# Patient Record
Sex: FEMALE | Race: WHITE | Employment: OTHER | URBAN - METROPOLITAN AREA
[De-identification: names, ages, dates, MRNs, and addresses within clinical notes are randomized per-mention and may not be internally consistent; named-entity substitution may affect disease eponyms.]

---

## 2017-01-06 ENCOUNTER — ANTI-COAG VISIT (OUTPATIENT)
Dept: CARDIOLOGY | Age: 67
End: 2017-01-06

## 2017-01-06 LAB — INR BLD: 2.2

## 2017-02-14 RX ORDER — SOTALOL HYDROCHLORIDE 80 MG/1
TABLET ORAL
Qty: 180 TABLET | Refills: 2 | Status: SHIPPED | OUTPATIENT
Start: 2017-02-14 | End: 2017-11-15 | Stop reason: SDUPTHER

## 2017-02-17 ENCOUNTER — ANTI-COAG VISIT (OUTPATIENT)
Dept: CARDIOLOGY | Age: 67
End: 2017-02-17

## 2017-02-17 LAB — INR BLD: 3.33

## 2017-02-21 DIAGNOSIS — I25.10 CORONARY ARTERY DISEASE INVOLVING NATIVE CORONARY ARTERY OF NATIVE HEART, ANGINA PRESENCE UNSPECIFIED: ICD-10-CM

## 2017-02-21 DIAGNOSIS — Z95.2 HEART VALVE REPLACED: ICD-10-CM

## 2017-02-21 DIAGNOSIS — I48.0 PAROXYSMAL ATRIAL FIBRILLATION (HCC): Primary | ICD-10-CM

## 2017-02-21 DIAGNOSIS — E78.00 PURE HYPERCHOLESTEROLEMIA: ICD-10-CM

## 2017-02-21 DIAGNOSIS — I10 ESSENTIAL HYPERTENSION: ICD-10-CM

## 2017-02-21 RX ORDER — FLUVASTATIN 40 MG/1
40 CAPSULE ORAL 2 TIMES DAILY
Qty: 180 CAPSULE | Refills: 3 | OUTPATIENT
Start: 2017-02-21 | End: 2017-11-17 | Stop reason: SDUPTHER

## 2017-03-02 DIAGNOSIS — Z79.01 ON ANTICOAGULANT THERAPY: Primary | ICD-10-CM

## 2017-03-02 DIAGNOSIS — I48.0 PAROXYSMAL ATRIAL FIBRILLATION (HCC): ICD-10-CM

## 2017-03-10 ENCOUNTER — ANTI-COAG VISIT (OUTPATIENT)
Dept: CARDIOLOGY | Age: 67
End: 2017-03-10

## 2017-03-10 LAB — INR BLD: 3.09

## 2017-04-03 LAB — INR BLD: 3.17

## 2017-04-14 ENCOUNTER — ANTI-COAG VISIT (OUTPATIENT)
Dept: CARDIOLOGY | Age: 67
End: 2017-04-14

## 2017-05-09 LAB — INR BLD: 2.62

## 2017-05-10 ENCOUNTER — ANTI-COAG VISIT (OUTPATIENT)
Dept: CARDIOLOGY | Age: 67
End: 2017-05-10

## 2017-06-02 RX ORDER — LOSARTAN POTASSIUM 50 MG/1
50 TABLET ORAL DAILY
Qty: 90 TABLET | Refills: 2 | Status: SHIPPED | OUTPATIENT
Start: 2017-06-02 | End: 2017-12-04 | Stop reason: ALTCHOICE

## 2017-06-02 RX ORDER — LOSARTAN POTASSIUM 50 MG/1
50 TABLET ORAL DAILY
Refills: 0 | COMMUNITY
Start: 2017-04-14 | End: 2017-06-02 | Stop reason: SDUPTHER

## 2017-06-15 LAB — INR BLD: 1.71

## 2017-06-19 RX ORDER — WARFARIN SODIUM 5 MG/1
TABLET ORAL
Qty: 180 TABLET | Refills: 2 | Status: SHIPPED | OUTPATIENT
Start: 2017-06-19 | End: 2017-12-12 | Stop reason: SDUPTHER

## 2017-06-29 ENCOUNTER — ANTI-COAG VISIT (OUTPATIENT)
Dept: CARDIOLOGY | Age: 67
End: 2017-06-29

## 2017-07-05 LAB — INR BLD: 2.22

## 2017-07-11 ENCOUNTER — ANTI-COAG VISIT (OUTPATIENT)
Dept: CARDIOLOGY | Age: 67
End: 2017-07-11

## 2017-08-21 LAB — INR BLD: 2.03

## 2017-08-25 ENCOUNTER — ANTI-COAG VISIT (OUTPATIENT)
Dept: CARDIOLOGY | Age: 67
End: 2017-08-25

## 2017-09-12 RX ORDER — LEVOTHYROXINE SODIUM 0.03 MG/1
TABLET ORAL
Qty: 90 TABLET | Refills: 3 | Status: SHIPPED | OUTPATIENT
Start: 2017-09-12 | End: 2017-12-20 | Stop reason: SDUPTHER

## 2017-10-03 ENCOUNTER — ANTI-COAG VISIT (OUTPATIENT)
Dept: CARDIOLOGY | Age: 67
End: 2017-10-03

## 2017-10-03 LAB — INR BLD: 2.61

## 2017-11-07 LAB — INR BLD: 2.12

## 2017-11-08 ENCOUNTER — ANTI-COAG VISIT (OUTPATIENT)
Dept: CARDIOLOGY | Age: 67
End: 2017-11-08

## 2017-11-08 LAB — INR BLD: 2.12

## 2017-11-09 ENCOUNTER — TELEPHONE (OUTPATIENT)
Dept: PHARMACY | Age: 67
End: 2017-11-09

## 2017-11-16 RX ORDER — SOTALOL HYDROCHLORIDE 80 MG/1
TABLET ORAL
Qty: 180 TABLET | Refills: 0 | Status: SHIPPED | OUTPATIENT
Start: 2017-11-16 | End: 2018-03-05 | Stop reason: SDUPTHER

## 2017-11-17 DIAGNOSIS — I48.0 PAROXYSMAL ATRIAL FIBRILLATION (HCC): ICD-10-CM

## 2017-11-17 DIAGNOSIS — E78.00 PURE HYPERCHOLESTEROLEMIA: ICD-10-CM

## 2017-11-17 DIAGNOSIS — I10 ESSENTIAL HYPERTENSION: ICD-10-CM

## 2017-11-17 DIAGNOSIS — Z95.2 HEART VALVE REPLACED: ICD-10-CM

## 2017-11-17 DIAGNOSIS — I25.10 CORONARY ARTERY DISEASE INVOLVING NATIVE CORONARY ARTERY OF NATIVE HEART, ANGINA PRESENCE UNSPECIFIED: ICD-10-CM

## 2017-11-20 RX ORDER — FLUVASTATIN 40 MG/1
40 CAPSULE ORAL 2 TIMES DAILY
Qty: 180 CAPSULE | Refills: 0 | Status: SHIPPED | OUTPATIENT
Start: 2017-11-20 | End: 2018-03-05 | Stop reason: SDUPTHER

## 2017-11-29 DIAGNOSIS — Z95.2 HEART VALVE REPLACED: ICD-10-CM

## 2017-11-29 DIAGNOSIS — Z79.01 ON ANTICOAGULANT THERAPY: ICD-10-CM

## 2017-11-29 DIAGNOSIS — I48.0 PAROXYSMAL ATRIAL FIBRILLATION (HCC): Primary | ICD-10-CM

## 2017-12-04 ENCOUNTER — OFFICE VISIT (OUTPATIENT)
Dept: CARDIOLOGY | Age: 67
End: 2017-12-04

## 2017-12-04 VITALS
TEMPERATURE: 98.9 F | HEIGHT: 67 IN | SYSTOLIC BLOOD PRESSURE: 144 MMHG | DIASTOLIC BLOOD PRESSURE: 80 MMHG | WEIGHT: 178 LBS | HEART RATE: 63 BPM | BODY MASS INDEX: 27.94 KG/M2 | RESPIRATION RATE: 16 BRPM | OXYGEN SATURATION: 100 %

## 2017-12-04 DIAGNOSIS — I10 ESSENTIAL HYPERTENSION: ICD-10-CM

## 2017-12-04 DIAGNOSIS — I48.20 CHRONIC ATRIAL FIBRILLATION (HCC): ICD-10-CM

## 2017-12-04 DIAGNOSIS — Z79.01 ON ANTICOAGULANT THERAPY: ICD-10-CM

## 2017-12-04 DIAGNOSIS — Z95.2 H/O PROSTHETIC AORTIC VALVE REPLACEMENT: Primary | ICD-10-CM

## 2017-12-04 DIAGNOSIS — E78.00 PURE HYPERCHOLESTEROLEMIA: ICD-10-CM

## 2017-12-04 PROCEDURE — 3017F COLORECTAL CA SCREEN DOC REV: CPT | Performed by: INTERNAL MEDICINE

## 2017-12-04 PROCEDURE — G8598 ASA/ANTIPLAT THER USED: HCPCS | Performed by: INTERNAL MEDICINE

## 2017-12-04 PROCEDURE — 93000 ELECTROCARDIOGRAM COMPLETE: CPT | Performed by: INTERNAL MEDICINE

## 2017-12-04 PROCEDURE — G8428 CUR MEDS NOT DOCUMENT: HCPCS | Performed by: INTERNAL MEDICINE

## 2017-12-04 PROCEDURE — G8484 FLU IMMUNIZE NO ADMIN: HCPCS | Performed by: INTERNAL MEDICINE

## 2017-12-04 PROCEDURE — 1090F PRES/ABSN URINE INCON ASSESS: CPT | Performed by: INTERNAL MEDICINE

## 2017-12-04 PROCEDURE — G8400 PT W/DXA NO RESULTS DOC: HCPCS | Performed by: INTERNAL MEDICINE

## 2017-12-04 PROCEDURE — 3014F SCREEN MAMMO DOC REV: CPT | Performed by: INTERNAL MEDICINE

## 2017-12-04 PROCEDURE — 1036F TOBACCO NON-USER: CPT | Performed by: INTERNAL MEDICINE

## 2017-12-04 PROCEDURE — 4040F PNEUMOC VAC/ADMIN/RCVD: CPT | Performed by: INTERNAL MEDICINE

## 2017-12-04 PROCEDURE — 99214 OFFICE O/P EST MOD 30 MIN: CPT | Performed by: INTERNAL MEDICINE

## 2017-12-04 PROCEDURE — 1123F ACP DISCUSS/DSCN MKR DOCD: CPT | Performed by: INTERNAL MEDICINE

## 2017-12-04 PROCEDURE — G8419 CALC BMI OUT NRM PARAM NOF/U: HCPCS | Performed by: INTERNAL MEDICINE

## 2017-12-04 ASSESSMENT — ENCOUNTER SYMPTOMS
COUGH: 0
NAUSEA: 0
EYES NEGATIVE: 1
WHEEZING: 0
CHEST TIGHTNESS: 0
BLOOD IN STOOL: 0
SHORTNESS OF BREATH: 0
GASTROINTESTINAL NEGATIVE: 1
STRIDOR: 0
RESPIRATORY NEGATIVE: 1

## 2017-12-07 ENCOUNTER — ANTI-COAG VISIT (OUTPATIENT)
Dept: CARDIOLOGY | Age: 67
End: 2017-12-07

## 2017-12-07 DIAGNOSIS — I48.0 PAROXYSMAL ATRIAL FIBRILLATION (HCC): Primary | ICD-10-CM

## 2017-12-07 DIAGNOSIS — Z79.01 ANTICOAGULANT LONG-TERM USE: ICD-10-CM

## 2017-12-07 DIAGNOSIS — R60.0 BILATERAL LEG EDEMA: Primary | ICD-10-CM

## 2017-12-07 DIAGNOSIS — I48.0 PAROXYSMAL ATRIAL FIBRILLATION (HCC): ICD-10-CM

## 2017-12-12 ENCOUNTER — ANTI-COAG VISIT (OUTPATIENT)
Dept: CARDIOLOGY | Age: 67
End: 2017-12-12

## 2017-12-12 LAB — INR BLD: 2

## 2017-12-13 RX ORDER — WARFARIN SODIUM 5 MG/1
TABLET ORAL
Qty: 180 TABLET | Refills: 2 | Status: SHIPPED | OUTPATIENT
Start: 2017-12-13 | End: 2017-12-20 | Stop reason: SDUPTHER

## 2017-12-20 RX ORDER — LEVOTHYROXINE SODIUM 0.03 MG/1
TABLET ORAL
Qty: 90 TABLET | Refills: 3 | Status: SHIPPED | OUTPATIENT
Start: 2017-12-20 | End: 2018-11-30 | Stop reason: SDUPTHER

## 2017-12-20 RX ORDER — WARFARIN SODIUM 5 MG/1
TABLET ORAL
Qty: 180 TABLET | Refills: 2 | Status: SHIPPED | OUTPATIENT
Start: 2017-12-20 | End: 2018-11-15 | Stop reason: SDUPTHER

## 2018-01-17 ENCOUNTER — ANTI-COAG VISIT (OUTPATIENT)
Dept: CARDIOLOGY | Age: 68
End: 2018-01-17

## 2018-01-17 LAB — INR BLD: 2.27

## 2018-02-20 LAB
INR BLD: 2.74 (ref 0.9–1.1)
PROTHROMBIN TIME: 30.7 SEC (ref 9.8–12.7)

## 2018-02-21 ENCOUNTER — ANTI-COAG VISIT (OUTPATIENT)
Dept: CARDIOLOGY CLINIC | Age: 68
End: 2018-02-21

## 2018-02-21 DIAGNOSIS — I48.0 PAROXYSMAL ATRIAL FIBRILLATION (HCC): ICD-10-CM

## 2018-02-21 DIAGNOSIS — Z95.2 HEART VALVE REPLACED: ICD-10-CM

## 2018-02-21 DIAGNOSIS — Z79.01 ON ANTICOAGULANT THERAPY: ICD-10-CM

## 2018-02-21 LAB — INR BLD: 2.74

## 2018-03-05 DIAGNOSIS — Z95.2 HEART VALVE REPLACED: ICD-10-CM

## 2018-03-05 DIAGNOSIS — I48.0 PAROXYSMAL ATRIAL FIBRILLATION (HCC): ICD-10-CM

## 2018-03-05 DIAGNOSIS — E78.00 PURE HYPERCHOLESTEROLEMIA: ICD-10-CM

## 2018-03-05 DIAGNOSIS — I25.10 CORONARY ARTERY DISEASE INVOLVING NATIVE CORONARY ARTERY OF NATIVE HEART, ANGINA PRESENCE UNSPECIFIED: ICD-10-CM

## 2018-03-05 DIAGNOSIS — I10 ESSENTIAL HYPERTENSION: ICD-10-CM

## 2018-03-05 RX ORDER — SOTALOL HYDROCHLORIDE 80 MG/1
TABLET ORAL
Qty: 180 TABLET | Refills: 2 | Status: SHIPPED | OUTPATIENT
Start: 2018-03-05 | End: 2018-11-30 | Stop reason: SDUPTHER

## 2018-03-05 RX ORDER — FLUVASTATIN 40 MG/1
40 CAPSULE ORAL 2 TIMES DAILY
Qty: 180 CAPSULE | Refills: 1 | Status: SHIPPED | OUTPATIENT
Start: 2018-03-05 | End: 2018-08-15 | Stop reason: SDUPTHER

## 2018-03-28 LAB
INR BLD: 2.3 (ref 0.9–1.1)
PROTHROMBIN TIME: 25.7 SEC (ref 9.8–12.7)

## 2018-03-30 ENCOUNTER — ANTI-COAG VISIT (OUTPATIENT)
Dept: CARDIOLOGY CLINIC | Age: 68
End: 2018-03-30

## 2018-04-30 LAB
INR BLD: 3.35 (ref 0.9–1.1)
PROTHROMBIN TIME: 37.6 SEC (ref 9.8–12.7)

## 2018-05-04 ENCOUNTER — ANTI-COAG VISIT (OUTPATIENT)
Dept: CARDIOLOGY CLINIC | Age: 68
End: 2018-05-04

## 2018-05-18 LAB
INR BLD: 3.25 (ref 0.9–1.1)
PROTHROMBIN TIME: 36.5 SEC (ref 9.8–12.7)

## 2018-05-23 ENCOUNTER — ANTI-COAG VISIT (OUTPATIENT)
Dept: CARDIOLOGY CLINIC | Age: 68
End: 2018-05-23

## 2018-06-18 LAB
INR BLD: 2.63 (ref 0.9–1.1)
PROTHROMBIN TIME: 29.5 SEC (ref 9.8–12.7)

## 2018-06-19 ENCOUNTER — ANTI-COAG VISIT (OUTPATIENT)
Dept: CARDIOLOGY CLINIC | Age: 68
End: 2018-06-19

## 2018-06-19 DIAGNOSIS — I48.0 PAROXYSMAL ATRIAL FIBRILLATION (HCC): ICD-10-CM

## 2018-06-19 DIAGNOSIS — Z79.01 ON ANTICOAGULANT THERAPY: ICD-10-CM

## 2018-06-19 DIAGNOSIS — Z95.2 HEART VALVE REPLACED: ICD-10-CM

## 2018-08-01 LAB
INR BLD: 2.42 (ref 0.9–1.1)
PROTHROMBIN TIME: 27.1 SEC (ref 9.8–12.7)

## 2018-08-03 ENCOUNTER — ANTI-COAG VISIT (OUTPATIENT)
Dept: CARDIOLOGY CLINIC | Age: 68
End: 2018-08-03

## 2018-08-15 DIAGNOSIS — Z95.2 HEART VALVE REPLACED: ICD-10-CM

## 2018-08-15 DIAGNOSIS — E78.00 PURE HYPERCHOLESTEROLEMIA: ICD-10-CM

## 2018-08-15 DIAGNOSIS — I48.0 PAROXYSMAL ATRIAL FIBRILLATION (HCC): ICD-10-CM

## 2018-08-15 DIAGNOSIS — I10 ESSENTIAL HYPERTENSION: ICD-10-CM

## 2018-08-15 DIAGNOSIS — I25.10 CORONARY ARTERY DISEASE INVOLVING NATIVE CORONARY ARTERY OF NATIVE HEART, ANGINA PRESENCE UNSPECIFIED: ICD-10-CM

## 2018-08-16 RX ORDER — FLUVASTATIN 40 MG/1
40 CAPSULE ORAL 2 TIMES DAILY
Qty: 180 CAPSULE | Refills: 3 | Status: SHIPPED | OUTPATIENT
Start: 2018-08-16 | End: 2018-09-11 | Stop reason: SDUPTHER

## 2018-09-06 LAB
INR BLD: 2.67 (ref 0.9–1.1)
PROTHROMBIN TIME: 29.9 SEC (ref 9.8–12.7)

## 2018-09-11 DIAGNOSIS — I25.10 CORONARY ARTERY DISEASE INVOLVING NATIVE CORONARY ARTERY OF NATIVE HEART, ANGINA PRESENCE UNSPECIFIED: ICD-10-CM

## 2018-09-11 DIAGNOSIS — Z95.2 HEART VALVE REPLACED: ICD-10-CM

## 2018-09-11 DIAGNOSIS — I10 ESSENTIAL HYPERTENSION: ICD-10-CM

## 2018-09-11 DIAGNOSIS — E78.00 PURE HYPERCHOLESTEROLEMIA: ICD-10-CM

## 2018-09-11 DIAGNOSIS — I48.0 PAROXYSMAL ATRIAL FIBRILLATION (HCC): ICD-10-CM

## 2018-09-11 RX ORDER — FLUVASTATIN 40 MG/1
40 CAPSULE ORAL 2 TIMES DAILY
Qty: 180 CAPSULE | Refills: 3 | Status: SHIPPED | OUTPATIENT
Start: 2018-09-11 | End: 2019-01-01 | Stop reason: SDUPTHER

## 2018-09-17 ENCOUNTER — ANTI-COAG VISIT (OUTPATIENT)
Dept: CARDIOLOGY CLINIC | Age: 68
End: 2018-09-17

## 2018-10-19 LAB
INR BLD: 2.57 (ref 0.9–1.1)
PROTHROMBIN TIME: 28.8 SEC (ref 9.8–12.7)

## 2018-10-22 ENCOUNTER — ANTI-COAG VISIT (OUTPATIENT)
Dept: CARDIOLOGY CLINIC | Age: 68
End: 2018-10-22

## 2018-10-22 DIAGNOSIS — Z95.2 HEART VALVE REPLACED: ICD-10-CM

## 2018-10-22 DIAGNOSIS — I48.0 PAROXYSMAL ATRIAL FIBRILLATION (HCC): ICD-10-CM

## 2018-10-22 DIAGNOSIS — Z79.01 ON ANTICOAGULANT THERAPY: ICD-10-CM

## 2018-11-16 RX ORDER — WARFARIN SODIUM 5 MG/1
TABLET ORAL
Qty: 180 TABLET | Refills: 3 | Status: SHIPPED | OUTPATIENT
Start: 2018-11-16 | End: 2019-01-01 | Stop reason: SDUPTHER

## 2018-11-26 LAB
INR BLD: 3.55 (ref 0.9–1.1)
PROTHROMBIN TIME: 41.2 SEC (ref 9.7–12.7)

## 2018-11-29 ENCOUNTER — ANTI-COAG VISIT (OUTPATIENT)
Dept: CARDIOLOGY CLINIC | Age: 68
End: 2018-11-29

## 2018-11-30 RX ORDER — SOTALOL HYDROCHLORIDE 80 MG/1
TABLET ORAL
Qty: 180 TABLET | Refills: 3 | Status: SHIPPED | OUTPATIENT
Start: 2018-11-30 | End: 2019-01-01 | Stop reason: SDUPTHER

## 2018-11-30 RX ORDER — LEVOTHYROXINE SODIUM 0.03 MG/1
TABLET ORAL
Qty: 90 TABLET | Refills: 3 | Status: SHIPPED | OUTPATIENT
Start: 2018-11-30 | End: 2019-01-01 | Stop reason: SDUPTHER

## 2018-12-10 LAB
INR BLD: 3.13 (ref 0.9–1.1)
PROTHROMBIN TIME: 36.2 SEC (ref 9.7–12.7)

## 2018-12-26 ENCOUNTER — ANTI-COAG VISIT (OUTPATIENT)
Dept: CARDIOLOGY CLINIC | Age: 68
End: 2018-12-26

## 2019-01-01 ENCOUNTER — HOSPITAL ENCOUNTER (OUTPATIENT)
Dept: PHARMACY | Age: 69
Setting detail: THERAPIES SERIES
Discharge: HOME | End: 2019-12-30
Payer: MEDICARE

## 2019-01-01 ENCOUNTER — HOSPITAL ENCOUNTER (OUTPATIENT)
Dept: PHARMACY | Age: 69
Setting detail: THERAPIES SERIES
Discharge: HOME | End: 2019-12-11
Payer: MEDICARE

## 2019-01-01 ENCOUNTER — HOSPITAL ENCOUNTER (OUTPATIENT)
Dept: PHARMACY | Age: 69
Setting detail: THERAPIES SERIES
Discharge: HOME | End: 2019-07-15
Payer: MEDICARE

## 2019-01-01 ENCOUNTER — HOSPITAL ENCOUNTER (OUTPATIENT)
Dept: PHARMACY | Age: 69
Setting detail: THERAPIES SERIES
Discharge: HOME | End: 2019-08-15
Payer: MEDICARE

## 2019-01-01 ENCOUNTER — HOSPITAL ENCOUNTER (OUTPATIENT)
Dept: PHARMACY | Age: 69
Setting detail: THERAPIES SERIES
Discharge: HOME | End: 2019-07-31
Payer: MEDICARE

## 2019-01-01 ENCOUNTER — APPOINTMENT (OUTPATIENT)
Dept: PHARMACY | Age: 69
End: 2019-01-01
Payer: MEDICARE

## 2019-01-01 ENCOUNTER — ANTI-COAG VISIT (OUTPATIENT)
Dept: CARDIOLOGY CLINIC | Age: 69
End: 2019-01-01

## 2019-01-01 ENCOUNTER — HOSPITAL ENCOUNTER (OUTPATIENT)
Dept: PHARMACY | Age: 69
Setting detail: THERAPIES SERIES
Discharge: HOME | End: 2019-11-07
Payer: MEDICARE

## 2019-01-01 ENCOUNTER — TELEPHONE (OUTPATIENT)
Dept: PHARMACY | Age: 69
End: 2019-01-01

## 2019-01-01 ENCOUNTER — HOSPITAL ENCOUNTER (OUTPATIENT)
Dept: PHARMACY | Age: 69
Setting detail: THERAPIES SERIES
Discharge: HOME | End: 2019-05-14
Payer: MEDICARE

## 2019-01-01 ENCOUNTER — HOSPITAL ENCOUNTER (OUTPATIENT)
Dept: PHARMACY | Age: 69
Setting detail: THERAPIES SERIES
Discharge: HOME | End: 2019-05-28
Payer: MEDICARE

## 2019-01-01 ENCOUNTER — HOSPITAL ENCOUNTER (OUTPATIENT)
Dept: PHARMACY | Age: 69
Setting detail: THERAPIES SERIES
Discharge: HOME | End: 2019-10-03
Payer: MEDICARE

## 2019-01-01 ENCOUNTER — HOSPITAL ENCOUNTER (OUTPATIENT)
Dept: PHARMACY | Age: 69
Setting detail: THERAPIES SERIES
Discharge: HOME | End: 2019-08-29
Payer: MEDICARE

## 2019-01-01 ENCOUNTER — HOSPITAL ENCOUNTER (OUTPATIENT)
Dept: PHARMACY | Age: 69
Setting detail: THERAPIES SERIES
Discharge: HOME | End: 2019-06-12
Payer: MEDICARE

## 2019-01-01 DIAGNOSIS — I48.20 CHRONIC ATRIAL FIBRILLATION (HCC): ICD-10-CM

## 2019-01-01 DIAGNOSIS — I10 ESSENTIAL HYPERTENSION: ICD-10-CM

## 2019-01-01 DIAGNOSIS — I25.10 CORONARY ARTERY DISEASE INVOLVING NATIVE CORONARY ARTERY OF NATIVE HEART, ANGINA PRESENCE UNSPECIFIED: ICD-10-CM

## 2019-01-01 DIAGNOSIS — E78.00 PURE HYPERCHOLESTEROLEMIA: ICD-10-CM

## 2019-01-01 DIAGNOSIS — I48.0 PAROXYSMAL ATRIAL FIBRILLATION (HCC): ICD-10-CM

## 2019-01-01 DIAGNOSIS — Z95.2 HEART VALVE REPLACED: ICD-10-CM

## 2019-01-01 LAB
INTERNATIONAL NORMALIZATION RATIO, POC: 1.3
INTERNATIONAL NORMALIZATION RATIO, POC: 1.5
INTERNATIONAL NORMALIZATION RATIO, POC: 1.5
INTERNATIONAL NORMALIZATION RATIO, POC: 1.8
INTERNATIONAL NORMALIZATION RATIO, POC: 2
INTERNATIONAL NORMALIZATION RATIO, POC: 2.1
INTERNATIONAL NORMALIZATION RATIO, POC: 2.6
INTERNATIONAL NORMALIZATION RATIO, POC: 2.9
INTERNATIONAL NORMALIZATION RATIO, POC: 3.2
INTERNATIONAL NORMALIZATION RATIO, POC: 3.5
INTERNATIONAL NORMALIZATION RATIO, POC: 4.4

## 2019-01-01 PROCEDURE — 85610 PROTHROMBIN TIME: CPT

## 2019-01-01 PROCEDURE — 99211 OFF/OP EST MAY X REQ PHY/QHP: CPT

## 2019-01-01 PROCEDURE — 85610 PROTHROMBIN TIME: CPT | Performed by: PHARMACIST

## 2019-01-01 PROCEDURE — 99211 OFF/OP EST MAY X REQ PHY/QHP: CPT | Performed by: PHARMACIST

## 2019-01-01 RX ORDER — FLUVASTATIN 40 MG/1
40 CAPSULE ORAL 2 TIMES DAILY
Qty: 60 CAPSULE | Refills: 2 | Status: SHIPPED | OUTPATIENT
Start: 2019-01-01 | End: 2019-01-01 | Stop reason: ALTCHOICE

## 2019-01-10 LAB
INR BLD: 3.84 (ref 0.9–1.1)
PROTHROMBIN TIME: 44.6 SEC (ref 9.7–12.7)

## 2019-01-14 ENCOUNTER — OFFICE VISIT (OUTPATIENT)
Dept: CARDIOLOGY CLINIC | Age: 69
End: 2019-01-14
Payer: MEDICARE

## 2019-01-14 VITALS
RESPIRATION RATE: 20 BRPM | SYSTOLIC BLOOD PRESSURE: 130 MMHG | DIASTOLIC BLOOD PRESSURE: 82 MMHG | WEIGHT: 183.6 LBS | HEART RATE: 71 BPM | BODY MASS INDEX: 28.82 KG/M2 | HEIGHT: 67 IN | OXYGEN SATURATION: 97 %

## 2019-01-14 DIAGNOSIS — I48.20 CHRONIC ATRIAL FIBRILLATION (HCC): Primary | ICD-10-CM

## 2019-01-14 DIAGNOSIS — I10 ESSENTIAL HYPERTENSION: ICD-10-CM

## 2019-01-14 DIAGNOSIS — I83.93 VARICOSE VEINS OF BOTH LOWER EXTREMITIES, UNSPECIFIED WHETHER COMPLICATED: ICD-10-CM

## 2019-01-14 DIAGNOSIS — I25.10 CORONARY ARTERY DISEASE INVOLVING NATIVE CORONARY ARTERY OF NATIVE HEART, ANGINA PRESENCE UNSPECIFIED: ICD-10-CM

## 2019-01-14 DIAGNOSIS — Z95.2 H/O PROSTHETIC AORTIC VALVE REPLACEMENT: ICD-10-CM

## 2019-01-14 PROCEDURE — G8419 CALC BMI OUT NRM PARAM NOF/U: HCPCS | Performed by: INTERNAL MEDICINE

## 2019-01-14 PROCEDURE — G8400 PT W/DXA NO RESULTS DOC: HCPCS | Performed by: INTERNAL MEDICINE

## 2019-01-14 PROCEDURE — G8427 DOCREV CUR MEDS BY ELIG CLIN: HCPCS | Performed by: INTERNAL MEDICINE

## 2019-01-14 PROCEDURE — G8598 ASA/ANTIPLAT THER USED: HCPCS | Performed by: INTERNAL MEDICINE

## 2019-01-14 PROCEDURE — 1036F TOBACCO NON-USER: CPT | Performed by: INTERNAL MEDICINE

## 2019-01-14 PROCEDURE — 1123F ACP DISCUSS/DSCN MKR DOCD: CPT | Performed by: INTERNAL MEDICINE

## 2019-01-14 PROCEDURE — 1090F PRES/ABSN URINE INCON ASSESS: CPT | Performed by: INTERNAL MEDICINE

## 2019-01-14 PROCEDURE — 3017F COLORECTAL CA SCREEN DOC REV: CPT | Performed by: INTERNAL MEDICINE

## 2019-01-14 PROCEDURE — 4040F PNEUMOC VAC/ADMIN/RCVD: CPT | Performed by: INTERNAL MEDICINE

## 2019-01-14 PROCEDURE — G8484 FLU IMMUNIZE NO ADMIN: HCPCS | Performed by: INTERNAL MEDICINE

## 2019-01-14 PROCEDURE — 99214 OFFICE O/P EST MOD 30 MIN: CPT | Performed by: INTERNAL MEDICINE

## 2019-01-14 PROCEDURE — 93000 ELECTROCARDIOGRAM COMPLETE: CPT | Performed by: INTERNAL MEDICINE

## 2019-01-14 PROCEDURE — 1101F PT FALLS ASSESS-DOCD LE1/YR: CPT | Performed by: INTERNAL MEDICINE

## 2019-01-14 ASSESSMENT — ENCOUNTER SYMPTOMS
EYES NEGATIVE: 1
STRIDOR: 0
BLOOD IN STOOL: 0
GASTROINTESTINAL NEGATIVE: 1
CHEST TIGHTNESS: 0
SHORTNESS OF BREATH: 0
RESPIRATORY NEGATIVE: 1
WHEEZING: 0
NAUSEA: 0
COUGH: 0

## 2019-01-16 ENCOUNTER — ANTI-COAG VISIT (OUTPATIENT)
Dept: CARDIOLOGY CLINIC | Age: 69
End: 2019-01-16

## 2019-01-22 DIAGNOSIS — Z79.01 ON ANTICOAGULANT THERAPY: Primary | ICD-10-CM

## 2019-01-22 DIAGNOSIS — I48.0 PAROXYSMAL ATRIAL FIBRILLATION (HCC): ICD-10-CM

## 2019-01-29 LAB
INR BLD: 2.38 (ref 0.9–1.1)
PROTHROMBIN TIME: 27.4 SEC (ref 9.7–12.7)

## 2019-01-30 ENCOUNTER — ANTI-COAG VISIT (OUTPATIENT)
Dept: CARDIOLOGY CLINIC | Age: 69
End: 2019-01-30

## 2019-03-11 LAB
INR BLD: 2.34 (ref 0.9–1.1)
PROTHROMBIN TIME: 26.9 SEC (ref 9.7–12.7)

## 2019-03-14 ENCOUNTER — ANTI-COAG VISIT (OUTPATIENT)
Dept: CARDIOLOGY CLINIC | Age: 69
End: 2019-03-14

## 2019-04-09 LAB
INR BLD: 2.6 (ref 0.9–1.1)
PROTHROMBIN TIME: 30.3 SEC (ref 9.7–12.7)

## 2019-05-14 NOTE — PROGRESS NOTES
Oral Anticoagulation Patient Education    Counseling provided to: patient  Anticoagulant: Warfarin   Handouts provided to patient include: medication, medications that increase risk of bleeding, nosebleeds    Counseling points included:  1. Indication for anticoagulation: Afib  2. Explanation of medication  3. Education on A. Fib and stroke; PE/DVT  4. Missed doses and timing of medication (contact healthcare provider if missed multiple doses)  5. Side effects: bruising and bleeding with emphasis on seeking medical attention if bleeding prolonged  6. Food or drug interactions that can increase risk of bleeding  7. OTC pain relief options: Tylenol vs NSAIDs  8. Contact provider if:   A. Changes made to medications   B. Uncontrolled bleeding/ Signs and symptoms of recurrent VTE   C. Procedures   D. Trauma and/or fall    E.  Pregnancy (for women of child bearing age)       INR  1.3 is sub therapetuic for this patient (goal range 2-3) and is reflective of 50 mg TWD from past 7 days (maintenance plan 55 mg TWD)  Patient verifies current dosing regimen, patient able to verbally recall dose  Patient reports 1  missed doses since last INR (patient states forgot on day of cataract surgery 5/10)   Patient denies s/sx clotting and/or stroke  Patient denies hematuria, epistaxis, rectal bleeding  Patient denies changes in diet, alcohol, or tobacco use  Reviewed medication list and drug allergies with patient, updated any medication additions or modifications accordingly  Patient also denies any pending medical or dental procedures scheduled at this time  Patient was instructed to boost today to 12.5 mg (usual 10 mg) and tomorrow to 10 mg (usual 5 mg), then return to 55 mg TWD and RTC 1 week, unable to come for 2 weeks

## 2019-06-12 NOTE — PROGRESS NOTES
Ms. Иван Conde is a 80 y.o. y/o female with history of Afib who presents today for anticoagulation monitoring and adjustment.   INR 2.1 is therapeutic for this patient (goal range 2-3) and is reflective of 60 mg TWD  Patient verifies current dosing regimen, patient able to verbally recall dose  Patient reports no  missed doses since last INR   Patient denies s/sx clotting and/or stroke  Patient denies hematuria, epistaxis, rectal bleeding  Patient denies changes in diet, alcohol, or tobacco use  Reviewed medication list and drug allergies with patient, updated any medication additions or modifications accordingly  Patient also denies any pending medical or dental procedures scheduled at this time  Patient was instructed to continue current regimen of 60 mg TWD and RTC 4 weeks (patient was 6 weeks with Dr. Magali Silverman)    Roshan Garcia, PharmD  Staff Pharmacist  6/12/2019 10:57 AM

## 2020-01-01 ENCOUNTER — HOSPITAL ENCOUNTER (OUTPATIENT)
Dept: PHARMACY | Age: 70
Setting detail: THERAPIES SERIES
Discharge: HOME | End: 2020-02-10
Payer: MEDICARE

## 2020-01-01 ENCOUNTER — HOSPITAL ENCOUNTER (OUTPATIENT)
Dept: PHARMACY | Age: 70
Setting detail: THERAPIES SERIES
Discharge: HOME | End: 2020-04-13
Payer: MEDICARE

## 2020-01-01 ENCOUNTER — APPOINTMENT (OUTPATIENT)
Dept: ULTRASOUND IMAGING | Age: 70
DRG: 291 | End: 2020-01-01
Attending: INTERNAL MEDICINE
Payer: MEDICARE

## 2020-01-01 ENCOUNTER — APPOINTMENT (OUTPATIENT)
Dept: GENERAL RADIOLOGY | Age: 70
DRG: 291 | End: 2020-01-01
Attending: INTERNAL MEDICINE
Payer: MEDICARE

## 2020-01-01 ENCOUNTER — OFFICE VISIT (OUTPATIENT)
Dept: CARDIOLOGY CLINIC | Age: 70
End: 2020-01-01
Payer: MEDICARE

## 2020-01-01 ENCOUNTER — TELEPHONE (OUTPATIENT)
Dept: PHARMACY | Age: 70
End: 2020-01-01

## 2020-01-01 ENCOUNTER — TELEPHONE (OUTPATIENT)
Dept: CARDIOLOGY CLINIC | Age: 70
End: 2020-01-01

## 2020-01-01 ENCOUNTER — HOSPITAL ENCOUNTER (INPATIENT)
Age: 70
LOS: 7 days | Discharge: HOME | DRG: 291 | End: 2020-02-04
Attending: INTERNAL MEDICINE | Admitting: INTERNAL MEDICINE
Payer: MEDICARE

## 2020-01-01 ENCOUNTER — HOSPITAL ENCOUNTER (OUTPATIENT)
Dept: PHARMACY | Age: 70
Setting detail: THERAPIES SERIES
Discharge: HOME | End: 2020-03-16
Payer: MEDICARE

## 2020-01-01 ENCOUNTER — HOSPITAL ENCOUNTER (OUTPATIENT)
Dept: PHARMACY | Age: 70
Setting detail: THERAPIES SERIES
Discharge: HOME | End: 2020-03-23
Payer: MEDICARE

## 2020-01-01 VITALS
OXYGEN SATURATION: 98 % | WEIGHT: 186.8 LBS | DIASTOLIC BLOOD PRESSURE: 70 MMHG | BODY MASS INDEX: 29.32 KG/M2 | HEART RATE: 81 BPM | TEMPERATURE: 98.2 F | SYSTOLIC BLOOD PRESSURE: 137 MMHG | HEIGHT: 67 IN | RESPIRATION RATE: 18 BRPM

## 2020-01-01 VITALS
BODY MASS INDEX: 28.72 KG/M2 | SYSTOLIC BLOOD PRESSURE: 136 MMHG | HEART RATE: 76 BPM | RESPIRATION RATE: 18 BRPM | OXYGEN SATURATION: 98 % | DIASTOLIC BLOOD PRESSURE: 86 MMHG | WEIGHT: 183.4 LBS

## 2020-01-01 VITALS
BODY MASS INDEX: 34.39 KG/M2 | DIASTOLIC BLOOD PRESSURE: 84 MMHG | SYSTOLIC BLOOD PRESSURE: 136 MMHG | WEIGHT: 219.6 LBS | OXYGEN SATURATION: 99 % | HEART RATE: 83 BPM | RESPIRATION RATE: 18 BRPM

## 2020-01-01 DIAGNOSIS — I48.20 CHRONIC ATRIAL FIBRILLATION (HCC): ICD-10-CM

## 2020-01-01 LAB
ALBUMIN SERPL-MCNC: 3.6 G/DL (ref 3.5–4.6)
ALP BLD-CCNC: 137 U/L (ref 40–130)
ALT SERPL-CCNC: 8 U/L (ref 0–33)
ANION GAP SERPL CALCULATED.3IONS-SCNC: 13 MEQ/L (ref 9–15)
ANION GAP SERPL CALCULATED.3IONS-SCNC: 14 MEQ/L (ref 9–15)
ANION GAP SERPL CALCULATED.3IONS-SCNC: 16 MEQ/L (ref 9–15)
ANION GAP SERPL CALCULATED.3IONS-SCNC: 16 MEQ/L (ref 9–15)
ANION GAP SERPL CALCULATED.3IONS-SCNC: 17 MEQ/L (ref 9–15)
ANION GAP SERPL CALCULATED.3IONS-SCNC: 17 MEQ/L (ref 9–15)
ANION GAP SERPL CALCULATED.3IONS-SCNC: 18 MEQ/L (ref 9–15)
ANION GAP SERPL CALCULATED.3IONS-SCNC: 20 MEQ/L (ref 9–15)
AST SERPL-CCNC: 12 U/L (ref 0–35)
BACTERIA: NEGATIVE /HPF
BASOPHILS ABSOLUTE: 0 K/UL (ref 0–0.2)
BASOPHILS RELATIVE PERCENT: 0.7 %
BILIRUB SERPL-MCNC: 0.7 MG/DL (ref 0.2–0.7)
BILIRUBIN URINE: NEGATIVE
BLOOD, URINE: NEGATIVE
BUN BLDV-MCNC: 41 MG/DL (ref 8–23)
BUN BLDV-MCNC: 43 MG/DL (ref 8–23)
BUN BLDV-MCNC: 45 MG/DL (ref 8–23)
BUN BLDV-MCNC: 50 MG/DL (ref 8–23)
BUN BLDV-MCNC: 55 MG/DL (ref 8–23)
BUN BLDV-MCNC: 58 MG/DL (ref 8–23)
BUN BLDV-MCNC: 63 MG/DL (ref 8–23)
BUN BLDV-MCNC: 66 MG/DL (ref 8–23)
CALCIUM SERPL-MCNC: 8.7 MG/DL (ref 8.5–9.9)
CALCIUM SERPL-MCNC: 8.7 MG/DL (ref 8.5–9.9)
CALCIUM SERPL-MCNC: 8.8 MG/DL (ref 8.5–9.9)
CALCIUM SERPL-MCNC: 8.9 MG/DL (ref 8.5–9.9)
CHLORIDE BLD-SCNC: 101 MEQ/L (ref 95–107)
CHLORIDE BLD-SCNC: 101 MEQ/L (ref 95–107)
CHLORIDE BLD-SCNC: 103 MEQ/L (ref 95–107)
CHLORIDE BLD-SCNC: 104 MEQ/L (ref 95–107)
CHLORIDE BLD-SCNC: 93 MEQ/L (ref 95–107)
CHLORIDE BLD-SCNC: 93 MEQ/L (ref 95–107)
CHLORIDE BLD-SCNC: 97 MEQ/L (ref 95–107)
CHLORIDE BLD-SCNC: 99 MEQ/L (ref 95–107)
CLARITY: CLEAR
CO2: 18 MEQ/L (ref 20–31)
CO2: 18 MEQ/L (ref 20–31)
CO2: 19 MEQ/L (ref 20–31)
CO2: 20 MEQ/L (ref 20–31)
CO2: 22 MEQ/L (ref 20–31)
CO2: 22 MEQ/L (ref 20–31)
CO2: 24 MEQ/L (ref 20–31)
CO2: 25 MEQ/L (ref 20–31)
COLOR: YELLOW
CREAT SERPL-MCNC: 4.11 MG/DL (ref 0.5–0.9)
CREAT SERPL-MCNC: 4.22 MG/DL (ref 0.5–0.9)
CREAT SERPL-MCNC: 4.36 MG/DL (ref 0.5–0.9)
CREAT SERPL-MCNC: 4.43 MG/DL (ref 0.5–0.9)
CREAT SERPL-MCNC: 4.5 MG/DL (ref 0.5–0.9)
CREAT SERPL-MCNC: 4.67 MG/DL (ref 0.5–0.9)
CREAT SERPL-MCNC: 4.78 MG/DL (ref 0.5–0.9)
CREAT SERPL-MCNC: 5 MG/DL (ref 0.5–0.9)
CREATININE URINE: 45.1 MG/DL
EKG ATRIAL RATE: 115 BPM
EKG ATRIAL RATE: 234 BPM
EKG Q-T INTERVAL: 424 MS
EKG Q-T INTERVAL: 444 MS
EKG QRS DURATION: 84 MS
EKG QRS DURATION: 86 MS
EKG QTC CALCULATION (BAZETT): 495 MS
EKG QTC CALCULATION (BAZETT): 502 MS
EKG R AXIS: 109 DEGREES
EKG R AXIS: 121 DEGREES
EKG T AXIS: 206 DEGREES
EKG T AXIS: 210 DEGREES
EKG VENTRICULAR RATE: 77 BPM
EKG VENTRICULAR RATE: 82 BPM
EOSINOPHILS ABSOLUTE: 0.1 K/UL (ref 0–0.7)
EOSINOPHILS RELATIVE PERCENT: 2.3 %
EPITHELIAL CELLS, UA: NORMAL /HPF (ref 0–5)
ERYTHROCYTE [DISTWIDTH] IN BLOOD BY AUTOMATED COUNT: 16.7 % (ref 11.5–14)
FERRITIN: 47.4 NG/ML (ref 13–150)
FOLATE: 9 NG/ML (ref 7.3–26.1)
GFR AFRICAN AMERICAN: 10.4
GFR AFRICAN AMERICAN: 11
GFR AFRICAN AMERICAN: 11.3
GFR AFRICAN AMERICAN: 11.8
GFR AFRICAN AMERICAN: 12
GFR AFRICAN AMERICAN: 12.2
GFR AFRICAN AMERICAN: 12.7
GFR AFRICAN AMERICAN: 13.1
GFR NON-AFRICAN AMERICAN: 10.1
GFR NON-AFRICAN AMERICAN: 10.5
GFR NON-AFRICAN AMERICAN: 10.8
GFR NON-AFRICAN AMERICAN: 8.6
GFR NON-AFRICAN AMERICAN: 9.1
GFR NON-AFRICAN AMERICAN: 9.3
GFR NON-AFRICAN AMERICAN: 9.7
GFR NON-AFRICAN AMERICAN: 9.9
GLOBULIN: 3.1 G/DL (ref 2.3–3.5)
GLUCOSE BLD-MCNC: 139 MG/DL (ref 70–99)
GLUCOSE BLD-MCNC: 85 MG/DL (ref 70–99)
GLUCOSE BLD-MCNC: 88 MG/DL (ref 70–99)
GLUCOSE BLD-MCNC: 89 MG/DL (ref 70–99)
GLUCOSE BLD-MCNC: 91 MG/DL (ref 70–99)
GLUCOSE BLD-MCNC: 93 MG/DL (ref 70–99)
GLUCOSE BLD-MCNC: 93 MG/DL (ref 70–99)
GLUCOSE BLD-MCNC: 99 MG/DL (ref 70–99)
GLUCOSE URINE: 100 MG/DL
HCT VFR BLD CALC: 30.8 % (ref 37–47)
HCT VFR BLD CALC: 31 % (ref 37–47)
HCT VFR BLD CALC: 31.8 % (ref 37–47)
HCT VFR BLD CALC: 32.3 % (ref 37–47)
HCT VFR BLD CALC: 32.3 % (ref 37–47)
HCT VFR BLD CALC: 32.4 % (ref 37–47)
HCT VFR BLD CALC: 32.5 % (ref 36–46)
HCT VFR BLD CALC: 33.2 % (ref 37–47)
HCT VFR BLD CALC: 34.9 % (ref 37–47)
HEMOGLOBIN: 10 G/DL (ref 12–16)
HEMOGLOBIN: 10 G/DL (ref 12–16)
HEMOGLOBIN: 10.3 G/DL (ref 12–16)
HEMOGLOBIN: 10.4 G/DL (ref 12–16)
HEMOGLOBIN: 10.8 G/DL (ref 12–16)
HEMOGLOBIN: 11 G/DL (ref 12–16)
HEMOGLOBIN: 9.9 G/DL (ref 12–16)
HYALINE CASTS: NORMAL /HPF (ref 0–5)
INR BLD: 1.5
INR BLD: 1.6
INR BLD: 1.9 (ref 0.9–1.1)
INR BLD: 2
INR BLD: 2.1
INR BLD: 3
INR BLD: 3.9
INR BLD: 4
INR BLD: 4.1
INR BLD: 4.2
INTERNATIONAL NORMALIZATION RATIO, POC: 1.4
INTERNATIONAL NORMALIZATION RATIO, POC: 1.8
INTERNATIONAL NORMALIZATION RATIO, POC: 2.7
IRON SATURATION: 13 % (ref 11–46)
IRON: 33 UG/DL (ref 37–145)
KETONES, URINE: NEGATIVE MG/DL
LEUKOCYTE ESTERASE, URINE: NEGATIVE
LV EF: 55 %
LVEF MODALITY: NORMAL
LYMPHOCYTES ABSOLUTE: 0.8 K/UL (ref 1–4.8)
LYMPHOCYTES RELATIVE PERCENT: 27.3 %
MAGNESIUM: 2.5 MG/DL (ref 1.7–2.4)
MAGNESIUM: 2.7 MG/DL (ref 1.7–2.4)
MCH RBC QN AUTO: 26.6 PG (ref 27–31.3)
MCH RBC QN AUTO: 26.7 PG (ref 27–31.3)
MCH RBC QN AUTO: 26.8 PG (ref 27–31.3)
MCH RBC QN AUTO: 27.2 PG (ref 27–31.3)
MCHC RBC AUTO-ENTMCNC: 30.8 G/DL (ref 32–36)
MCHC RBC AUTO-ENTMCNC: 31.5 % (ref 33–37)
MCHC RBC AUTO-ENTMCNC: 32.1 % (ref 33–37)
MCHC RBC AUTO-ENTMCNC: 32.1 % (ref 33–37)
MCHC RBC AUTO-ENTMCNC: 32.2 % (ref 33–37)
MCHC RBC AUTO-ENTMCNC: 32.3 % (ref 33–37)
MCHC RBC AUTO-ENTMCNC: 32.3 % (ref 33–37)
MCHC RBC AUTO-ENTMCNC: 32.4 % (ref 33–37)
MCHC RBC AUTO-ENTMCNC: 32.4 % (ref 33–37)
MCV RBC AUTO: 82.4 FL (ref 82–100)
MCV RBC AUTO: 82.6 FL (ref 82–100)
MCV RBC AUTO: 82.8 FL (ref 82–100)
MCV RBC AUTO: 83 FL (ref 82–100)
MCV RBC AUTO: 83.1 FL (ref 82–100)
MCV RBC AUTO: 83.3 FL (ref 82–100)
MCV RBC AUTO: 83.7 FL (ref 82–100)
MCV RBC AUTO: 84.5 FL (ref 82–100)
MCV RBC AUTO: 86 FL (ref 80–100)
MICROALBUMIN UR-MCNC: 19.2 MG/DL
MICROALBUMIN/CREAT UR-RTO: 425.7 MG/G (ref 0–30)
MONOCYTES ABSOLUTE: 0.3 K/UL (ref 0.2–0.8)
MONOCYTES RELATIVE PERCENT: 9.1 %
NEUTROPHILS ABSOLUTE: 1.7 K/UL (ref 1.4–6.5)
NEUTROPHILS RELATIVE PERCENT: 60.6 %
NITRITE, URINE: NEGATIVE
PARATHYROID HORMONE INTACT: 92.7 PG/ML (ref 15–65)
PDW BLD-RTO: 17.5 % (ref 11.5–14.5)
PDW BLD-RTO: 17.6 % (ref 11.5–14.5)
PDW BLD-RTO: 17.7 % (ref 11.5–14.5)
PDW BLD-RTO: 17.7 % (ref 11.5–14.5)
PDW BLD-RTO: 17.9 % (ref 11.5–14.5)
PDW BLD-RTO: 17.9 % (ref 11.5–14.5)
PDW BLD-RTO: 18 % (ref 11.5–14.5)
PDW BLD-RTO: 18.1 % (ref 11.5–14.5)
PH UA: 5.5 (ref 5–9)
PHOSPHORUS: 5 MG/DL (ref 2.3–4.8)
PLATELET # BLD: 100 K/UL (ref 130–400)
PLATELET # BLD: 101 K/UL (ref 130–400)
PLATELET # BLD: 102 K/UL (ref 130–400)
PLATELET # BLD: 107 K/UL (ref 130–400)
PLATELET # BLD: 112 X10E9/L (ref 150–450)
PLATELET # BLD: 120 K/UL (ref 130–400)
PLATELET # BLD: 95 K/UL (ref 130–400)
PLATELET # BLD: 97 K/UL (ref 130–400)
PLATELET # BLD: 97 K/UL (ref 130–400)
PLATELET SLIDE REVIEW: ABNORMAL
POTASSIUM SERPL-SCNC: 4.2 MEQ/L (ref 3.4–4.9)
POTASSIUM SERPL-SCNC: 4.4 MEQ/L (ref 3.4–4.9)
POTASSIUM SERPL-SCNC: 4.5 MEQ/L (ref 3.4–4.9)
POTASSIUM SERPL-SCNC: 4.7 MEQ/L (ref 3.4–4.9)
POTASSIUM SERPL-SCNC: 5 MEQ/L (ref 3.4–4.9)
POTASSIUM SERPL-SCNC: 5.4 MEQ/L (ref 3.4–4.9)
PROTEIN UA: 30 MG/DL
PROTHROMBIN TIME: 18.4 SEC (ref 12.3–14.9)
PROTHROMBIN TIME: 20.1 SEC (ref 12.3–14.9)
PROTHROMBIN TIME: 21.5 SEC (ref 9.7–12.7)
PROTHROMBIN TIME: 23.5 SEC (ref 12.3–14.9)
PROTHROMBIN TIME: 24.4 SEC (ref 12.3–14.9)
PROTHROMBIN TIME: 32.1 SEC (ref 12.3–14.9)
PROTHROMBIN TIME: 40.3 SEC (ref 12.3–14.9)
PROTHROMBIN TIME: 40.6 SEC (ref 12.3–14.9)
PROTHROMBIN TIME: 41.5 SEC (ref 12.3–14.9)
PROTHROMBIN TIME: 42.8 SEC (ref 12.3–14.9)
RBC # BLD: 3.72 M/UL (ref 4.2–5.4)
RBC # BLD: 3.76 M/UL (ref 4.2–5.4)
RBC # BLD: 3.79 X10E12/L (ref 4–5.2)
RBC # BLD: 3.85 M/UL (ref 4.2–5.4)
RBC # BLD: 3.88 M/UL (ref 4.2–5.4)
RBC # BLD: 3.89 M/UL (ref 4.2–5.4)
RBC # BLD: 3.9 M/UL (ref 4.2–5.4)
RBC # BLD: 3.96 M/UL (ref 4.2–5.4)
RBC # BLD: 4.13 M/UL (ref 4.2–5.4)
RBC UA: NORMAL /HPF (ref 0–5)
RETICULOCYTE ABSOLUTE COUNT: 0.03 M/CUMM (ref 0.02–0.11)
RETICULOCYTE COUNT PCT: 0.8 % (ref 0.6–2.2)
SLIDE REVIEW: ABNORMAL
SODIUM BLD-SCNC: 132 MEQ/L (ref 135–144)
SODIUM BLD-SCNC: 134 MEQ/L (ref 135–144)
SODIUM BLD-SCNC: 135 MEQ/L (ref 135–144)
SODIUM BLD-SCNC: 135 MEQ/L (ref 135–144)
SODIUM BLD-SCNC: 137 MEQ/L (ref 135–144)
SODIUM BLD-SCNC: 138 MEQ/L (ref 135–144)
SODIUM BLD-SCNC: 138 MEQ/L (ref 135–144)
SODIUM BLD-SCNC: 141 MEQ/L (ref 135–144)
SPECIFIC GRAVITY UA: 1.01 (ref 1–1.03)
TOTAL CK: 23 U/L (ref 0–170)
TOTAL IRON BINDING CAPACITY: 262 UG/DL (ref 178–450)
TOTAL PROTEIN: 6.7 G/DL (ref 6.3–8)
TSH SERPL DL<=0.05 MIU/L-ACNC: 4.88 UIU/ML (ref 0.44–3.86)
UROBILINOGEN, URINE: 0.2 E.U./DL
VITAMIN B-12: 347 PG/ML (ref 232–1245)
WBC # BLD: 2.9 K/UL (ref 4.8–10.8)
WBC # BLD: 2.9 K/UL (ref 4.8–10.8)
WBC # BLD: 3.1 K/UL (ref 4.8–10.8)
WBC # BLD: 3.2 K/UL (ref 4.8–10.8)
WBC # BLD: 3.3 K/UL (ref 4.8–10.8)
WBC # BLD: 3.4 K/UL (ref 4.8–10.8)
WBC # BLD: 3.5 K/UL (ref 4.8–10.8)
WBC # BLD: 3.7 K/UL (ref 4.8–10.8)
WBC UA: NORMAL /HPF (ref 0–5)
WBC: 3.7 X10E9/L (ref 4.4–11.3)

## 2020-01-01 PROCEDURE — 85610 PROTHROMBIN TIME: CPT

## 2020-01-01 PROCEDURE — 2580000003 HC RX 258

## 2020-01-01 PROCEDURE — 80048 BASIC METABOLIC PNL TOTAL CA: CPT

## 2020-01-01 PROCEDURE — 1090F PRES/ABSN URINE INCON ASSESS: CPT | Performed by: INTERNAL MEDICINE

## 2020-01-01 PROCEDURE — 85046 RETICYTE/HGB CONCENTRATE: CPT

## 2020-01-01 PROCEDURE — 2580000003 HC RX 258: Performed by: INTERNAL MEDICINE

## 2020-01-01 PROCEDURE — 85027 COMPLETE CBC AUTOMATED: CPT

## 2020-01-01 PROCEDURE — 99239 HOSP IP/OBS DSCHRG MGMT >30: CPT | Performed by: INTERNAL MEDICINE

## 2020-01-01 PROCEDURE — 83970 ASSAY OF PARATHORMONE: CPT

## 2020-01-01 PROCEDURE — 82607 VITAMIN B-12: CPT

## 2020-01-01 PROCEDURE — 4040F PNEUMOC VAC/ADMIN/RCVD: CPT | Performed by: INTERNAL MEDICINE

## 2020-01-01 PROCEDURE — 2060000000 HC ICU INTERMEDIATE R&B

## 2020-01-01 PROCEDURE — 36415 COLL VENOUS BLD VENIPUNCTURE: CPT

## 2020-01-01 PROCEDURE — 51702 INSERT TEMP BLADDER CATH: CPT

## 2020-01-01 PROCEDURE — 93010 ELECTROCARDIOGRAM REPORT: CPT | Performed by: INTERNAL MEDICINE

## 2020-01-01 PROCEDURE — 6370000000 HC RX 637 (ALT 250 FOR IP): Performed by: INTERNAL MEDICINE

## 2020-01-01 PROCEDURE — 99211 OFF/OP EST MAY X REQ PHY/QHP: CPT

## 2020-01-01 PROCEDURE — G8427 DOCREV CUR MEDS BY ELIG CLIN: HCPCS | Performed by: INTERNAL MEDICINE

## 2020-01-01 PROCEDURE — 82550 ASSAY OF CK (CPK): CPT

## 2020-01-01 PROCEDURE — 99233 SBSQ HOSP IP/OBS HIGH 50: CPT | Performed by: INTERNAL MEDICINE

## 2020-01-01 PROCEDURE — G8417 CALC BMI ABV UP PARAM F/U: HCPCS | Performed by: INTERNAL MEDICINE

## 2020-01-01 PROCEDURE — 93975 VASCULAR STUDY: CPT

## 2020-01-01 PROCEDURE — 85025 COMPLETE CBC W/AUTO DIFF WBC: CPT

## 2020-01-01 PROCEDURE — 6370000000 HC RX 637 (ALT 250 FOR IP): Performed by: STUDENT IN AN ORGANIZED HEALTH CARE EDUCATION/TRAINING PROGRAM

## 2020-01-01 PROCEDURE — 83550 IRON BINDING TEST: CPT

## 2020-01-01 PROCEDURE — 97161 PT EVAL LOW COMPLEX 20 MIN: CPT

## 2020-01-01 PROCEDURE — 82043 UR ALBUMIN QUANTITATIVE: CPT

## 2020-01-01 PROCEDURE — 93005 ELECTROCARDIOGRAM TRACING: CPT | Performed by: INTERNAL MEDICINE

## 2020-01-01 PROCEDURE — 99223 1ST HOSP IP/OBS HIGH 75: CPT | Performed by: INTERNAL MEDICINE

## 2020-01-01 PROCEDURE — G8400 PT W/DXA NO RESULTS DOC: HCPCS | Performed by: INTERNAL MEDICINE

## 2020-01-01 PROCEDURE — 1123F ACP DISCUSS/DSCN MKR DOCD: CPT | Performed by: INTERNAL MEDICINE

## 2020-01-01 PROCEDURE — 97166 OT EVAL MOD COMPLEX 45 MIN: CPT

## 2020-01-01 PROCEDURE — 6360000002 HC RX W HCPCS: Performed by: INTERNAL MEDICINE

## 2020-01-01 PROCEDURE — 80053 COMPREHEN METABOLIC PANEL: CPT

## 2020-01-01 PROCEDURE — 3017F COLORECTAL CA SCREEN DOC REV: CPT | Performed by: INTERNAL MEDICINE

## 2020-01-01 PROCEDURE — 93306 TTE W/DOPPLER COMPLETE: CPT

## 2020-01-01 PROCEDURE — 99232 SBSQ HOSP IP/OBS MODERATE 35: CPT | Performed by: INTERNAL MEDICINE

## 2020-01-01 PROCEDURE — 6360000002 HC RX W HCPCS

## 2020-01-01 PROCEDURE — 1036F TOBACCO NON-USER: CPT | Performed by: INTERNAL MEDICINE

## 2020-01-01 PROCEDURE — G8484 FLU IMMUNIZE NO ADMIN: HCPCS | Performed by: INTERNAL MEDICINE

## 2020-01-01 PROCEDURE — 71046 X-RAY EXAM CHEST 2 VIEWS: CPT

## 2020-01-01 PROCEDURE — 84443 ASSAY THYROID STIM HORMONE: CPT

## 2020-01-01 PROCEDURE — 81001 URINALYSIS AUTO W/SCOPE: CPT

## 2020-01-01 PROCEDURE — 84100 ASSAY OF PHOSPHORUS: CPT

## 2020-01-01 PROCEDURE — 93975 VASCULAR STUDY: CPT | Performed by: INTERNAL MEDICINE

## 2020-01-01 PROCEDURE — 1111F DSCHRG MED/CURRENT MED MERGE: CPT | Performed by: INTERNAL MEDICINE

## 2020-01-01 PROCEDURE — 99214 OFFICE O/P EST MOD 30 MIN: CPT | Performed by: INTERNAL MEDICINE

## 2020-01-01 PROCEDURE — 83540 ASSAY OF IRON: CPT

## 2020-01-01 PROCEDURE — 83735 ASSAY OF MAGNESIUM: CPT

## 2020-01-01 PROCEDURE — 6370000000 HC RX 637 (ALT 250 FOR IP)

## 2020-01-01 PROCEDURE — 93000 ELECTROCARDIOGRAM COMPLETE: CPT | Performed by: INTERNAL MEDICINE

## 2020-01-01 PROCEDURE — 99215 OFFICE O/P EST HI 40 MIN: CPT | Performed by: INTERNAL MEDICINE

## 2020-01-01 PROCEDURE — 82570 ASSAY OF URINE CREATININE: CPT

## 2020-01-01 PROCEDURE — 82728 ASSAY OF FERRITIN: CPT

## 2020-01-01 PROCEDURE — 82746 ASSAY OF FOLIC ACID SERUM: CPT

## 2020-01-01 PROCEDURE — 94150 VITAL CAPACITY TEST: CPT

## 2020-01-01 RX ORDER — TORSEMIDE 20 MG/1
20 TABLET ORAL DAILY
Status: DISCONTINUED | OUTPATIENT
Start: 2020-01-01 | End: 2020-01-01

## 2020-01-01 RX ORDER — WARFARIN SODIUM 5 MG/1
TABLET ORAL
Qty: 30 TABLET | Refills: 3 | Status: SHIPPED | OUTPATIENT
Start: 2020-01-01 | End: 2020-01-01

## 2020-01-01 RX ORDER — METOLAZONE 5 MG/1
5 TABLET ORAL DAILY
Status: COMPLETED | OUTPATIENT
Start: 2020-01-01 | End: 2020-01-01

## 2020-01-01 RX ORDER — LEVOTHYROXINE SODIUM 0.03 MG/1
TABLET ORAL
Qty: 90 TABLET | Refills: 0 | Status: SHIPPED | OUTPATIENT
Start: 2020-01-01

## 2020-01-01 RX ORDER — WARFARIN SODIUM 5 MG/1
TABLET ORAL
Qty: 180 TABLET | Refills: 2 | Status: SHIPPED | OUTPATIENT
Start: 2020-01-01 | End: 2020-01-01

## 2020-01-01 RX ORDER — METOLAZONE 5 MG/1
5 TABLET ORAL DAILY
Status: DISCONTINUED | OUTPATIENT
Start: 2020-01-01 | End: 2020-01-01

## 2020-01-01 RX ORDER — SENNA AND DOCUSATE SODIUM 50; 8.6 MG/1; MG/1
2 TABLET, FILM COATED ORAL DAILY
Qty: 30 TABLET | Refills: 3 | Status: SHIPPED | OUTPATIENT
Start: 2020-01-01 | End: 2020-01-01

## 2020-01-01 RX ORDER — POTASSIUM CHLORIDE 20 MEQ/1
20 TABLET, EXTENDED RELEASE ORAL 2 TIMES DAILY WITH MEALS
Status: DISCONTINUED | OUTPATIENT
Start: 2020-01-01 | End: 2020-01-01

## 2020-01-01 RX ORDER — FERROUS SULFATE 325(65) MG
325 TABLET ORAL 2 TIMES DAILY WITH MEALS
Status: DISCONTINUED | OUTPATIENT
Start: 2020-01-01 | End: 2020-01-01 | Stop reason: HOSPADM

## 2020-01-01 RX ORDER — METOLAZONE 2.5 MG/1
2.5 TABLET ORAL DAILY
Qty: 30 TABLET | Refills: 3 | Status: SHIPPED | OUTPATIENT
Start: 2020-01-01

## 2020-01-01 RX ORDER — TORSEMIDE 20 MG/1
40 TABLET ORAL DAILY
Qty: 30 TABLET | Refills: 3 | Status: SHIPPED | OUTPATIENT
Start: 2020-01-01

## 2020-01-01 RX ORDER — SOTALOL HYDROCHLORIDE 80 MG/1
80 TABLET ORAL 2 TIMES DAILY
Status: DISCONTINUED | OUTPATIENT
Start: 2020-01-01 | End: 2020-01-01

## 2020-01-01 RX ORDER — DIGOXIN 125 MCG
125 TABLET ORAL EVERY OTHER DAY
Status: DISCONTINUED | OUTPATIENT
Start: 2020-01-01 | End: 2020-01-01 | Stop reason: HOSPADM

## 2020-01-01 RX ORDER — SOTALOL HYDROCHLORIDE 80 MG/1
TABLET ORAL
Qty: 180 TABLET | Refills: 0 | Status: ON HOLD | OUTPATIENT
Start: 2020-01-01 | End: 2020-01-01 | Stop reason: HOSPADM

## 2020-01-01 RX ORDER — LANOLIN ALCOHOL/MO/W.PET/CERES
400 CREAM (GRAM) TOPICAL DAILY
Status: DISCONTINUED | OUTPATIENT
Start: 2020-01-01 | End: 2020-01-01 | Stop reason: HOSPADM

## 2020-01-01 RX ORDER — WARFARIN SODIUM 5 MG/1
TABLET ORAL
Qty: 180 TABLET | Refills: 0 | Status: ON HOLD | OUTPATIENT
Start: 2020-01-01 | End: 2020-01-01 | Stop reason: HOSPADM

## 2020-01-01 RX ORDER — TORSEMIDE 20 MG/1
20 TABLET ORAL ONCE
Status: COMPLETED | OUTPATIENT
Start: 2020-01-01 | End: 2020-01-01

## 2020-01-01 RX ORDER — WARFARIN SODIUM 5 MG/1
TABLET ORAL
Qty: 180 TABLET | Refills: 3 | Status: SHIPPED | OUTPATIENT
Start: 2020-01-01

## 2020-01-01 RX ORDER — LEVOTHYROXINE SODIUM 0.03 MG/1
25 TABLET ORAL DAILY
Status: DISCONTINUED | OUTPATIENT
Start: 2020-01-01 | End: 2020-01-01 | Stop reason: HOSPADM

## 2020-01-01 RX ORDER — SPIRONOLACTONE 50 MG/1
TABLET, FILM COATED ORAL
Status: DISCONTINUED
Start: 2020-01-01 | End: 2020-01-01 | Stop reason: WASHOUT

## 2020-01-01 RX ORDER — TORSEMIDE 20 MG/1
40 TABLET ORAL DAILY
Status: DISCONTINUED | OUTPATIENT
Start: 2020-01-01 | End: 2020-01-01 | Stop reason: HOSPADM

## 2020-01-01 RX ORDER — POTASSIUM CHLORIDE 20 MEQ/1
20 TABLET, EXTENDED RELEASE ORAL
Status: DISCONTINUED | OUTPATIENT
Start: 2020-01-01 | End: 2020-01-01

## 2020-01-01 RX ORDER — WARFARIN SODIUM 5 MG/1
10 TABLET ORAL
Status: COMPLETED | OUTPATIENT
Start: 2020-01-01 | End: 2020-01-01

## 2020-01-01 RX ORDER — POTASSIUM CHLORIDE 20 MEQ/1
40 TABLET, EXTENDED RELEASE ORAL 2 TIMES DAILY WITH MEALS
Status: DISCONTINUED | OUTPATIENT
Start: 2020-01-01 | End: 2020-01-01

## 2020-01-01 RX ORDER — DIGOXIN 125 MCG
125 TABLET ORAL EVERY OTHER DAY
Qty: 30 TABLET | Refills: 3 | Status: SHIPPED | OUTPATIENT
Start: 2020-01-01

## 2020-01-01 RX ORDER — ACETAMINOPHEN 325 MG/1
650 TABLET ORAL EVERY 4 HOURS PRN
Status: DISCONTINUED | OUTPATIENT
Start: 2020-01-01 | End: 2020-01-01 | Stop reason: HOSPADM

## 2020-01-01 RX ORDER — SPIRONOLACTONE 25 MG/1
25 TABLET ORAL DAILY
Status: DISCONTINUED | OUTPATIENT
Start: 2020-01-01 | End: 2020-01-01

## 2020-01-01 RX ORDER — SENNA AND DOCUSATE SODIUM 50; 8.6 MG/1; MG/1
2 TABLET, FILM COATED ORAL DAILY
Status: DISCONTINUED | OUTPATIENT
Start: 2020-01-01 | End: 2020-01-01 | Stop reason: HOSPADM

## 2020-01-01 RX ORDER — CARVEDILOL 12.5 MG/1
12.5 TABLET ORAL 2 TIMES DAILY WITH MEALS
Status: DISCONTINUED | OUTPATIENT
Start: 2020-01-01 | End: 2020-01-01 | Stop reason: HOSPADM

## 2020-01-01 RX ORDER — METOLAZONE 5 MG/1
2.5 TABLET ORAL DAILY
Status: DISCONTINUED | OUTPATIENT
Start: 2020-01-01 | End: 2020-01-01 | Stop reason: HOSPADM

## 2020-01-01 RX ORDER — FERROUS SULFATE 325(65) MG
325 TABLET ORAL 2 TIMES DAILY WITH MEALS
Qty: 30 TABLET | Refills: 3 | Status: SHIPPED | OUTPATIENT
Start: 2020-01-01

## 2020-01-01 RX ORDER — CYANOCOBALAMIN (VITAMIN B-12) 1000 MCG
2 TABLET, EXTENDED RELEASE ORAL DAILY
Status: DISCONTINUED | OUTPATIENT
Start: 2020-01-01 | End: 2020-01-01 | Stop reason: HOSPADM

## 2020-01-01 RX ORDER — WARFARIN SODIUM 1 MG/1
TABLET ORAL
Qty: 30 TABLET | Refills: 3 | Status: SHIPPED
Start: 2020-01-01

## 2020-01-01 RX ADMIN — POTASSIUM CHLORIDE 40 MEQ: 20 TABLET, EXTENDED RELEASE ORAL at 08:22

## 2020-01-01 RX ADMIN — Medication 400 MG: at 08:45

## 2020-01-01 RX ADMIN — WARFARIN SODIUM 7.5 MG: 2.5 TABLET ORAL at 11:12

## 2020-01-01 RX ADMIN — POTASSIUM CHLORIDE 20 MEQ: 20 TABLET, EXTENDED RELEASE ORAL at 07:53

## 2020-01-01 RX ADMIN — SPIRONOLACTONE 25 MG: 25 TABLET ORAL at 08:03

## 2020-01-01 RX ADMIN — Medication 2 TABLET: at 08:08

## 2020-01-01 RX ADMIN — CARVEDILOL 12.5 MG: 12.5 TABLET, FILM COATED ORAL at 21:53

## 2020-01-01 RX ADMIN — DIGOXIN 125 MCG: 125 TABLET ORAL at 11:40

## 2020-01-01 RX ADMIN — FUROSEMIDE 15 MG/HR: 10 INJECTION, SOLUTION INTRAVENOUS at 03:19

## 2020-01-01 RX ADMIN — SOTALOL HYDROCHLORIDE 80 MG: 80 TABLET ORAL at 21:54

## 2020-01-01 RX ADMIN — CHLOROTHIAZIDE SODIUM 500 MG: 500 INJECTION, POWDER, LYOPHILIZED, FOR SOLUTION INTRAVENOUS at 23:01

## 2020-01-01 RX ADMIN — CARVEDILOL 12.5 MG: 12.5 TABLET, FILM COATED ORAL at 08:22

## 2020-01-01 RX ADMIN — LEVOTHYROXINE SODIUM 25 MCG: 25 TABLET ORAL at 08:25

## 2020-01-01 RX ADMIN — SOTALOL HYDROCHLORIDE 80 MG: 80 TABLET ORAL at 07:52

## 2020-01-01 RX ADMIN — CARVEDILOL 12.5 MG: 12.5 TABLET, FILM COATED ORAL at 08:08

## 2020-01-01 RX ADMIN — Medication 400 MG: at 07:38

## 2020-01-01 RX ADMIN — CARVEDILOL 12.5 MG: 12.5 TABLET, FILM COATED ORAL at 16:27

## 2020-01-01 RX ADMIN — METOLAZONE 5 MG: 5 TABLET ORAL at 08:59

## 2020-01-01 RX ADMIN — SOTALOL HYDROCHLORIDE 80 MG: 80 TABLET ORAL at 07:37

## 2020-01-01 RX ADMIN — FUROSEMIDE 10 MG/HR: 10 INJECTION, SOLUTION INTRAVENOUS at 07:23

## 2020-01-01 RX ADMIN — CARVEDILOL 12.5 MG: 12.5 TABLET, FILM COATED ORAL at 17:09

## 2020-01-01 RX ADMIN — Medication 400 MG: at 08:08

## 2020-01-01 RX ADMIN — SOTALOL HYDROCHLORIDE 80 MG: 80 TABLET ORAL at 08:03

## 2020-01-01 RX ADMIN — TORSEMIDE 20 MG: 20 TABLET ORAL at 16:04

## 2020-01-01 RX ADMIN — METOLAZONE 5 MG: 5 TABLET ORAL at 10:19

## 2020-01-01 RX ADMIN — FUROSEMIDE 15 MG/HR: 10 INJECTION, SOLUTION INTRAVENOUS at 11:10

## 2020-01-01 RX ADMIN — POTASSIUM CHLORIDE 20 MEQ: 20 TABLET, EXTENDED RELEASE ORAL at 08:25

## 2020-01-01 RX ADMIN — SPIRONOLACTONE 25 MG: 50 TABLET ORAL at 13:43

## 2020-01-01 RX ADMIN — METOLAZONE 5 MG: 5 TABLET ORAL at 11:12

## 2020-01-01 RX ADMIN — METOLAZONE 2.5 MG: 5 TABLET ORAL at 08:08

## 2020-01-01 RX ADMIN — SOTALOL HYDROCHLORIDE 80 MG: 80 TABLET ORAL at 20:21

## 2020-01-01 RX ADMIN — FUROSEMIDE 15 MG/HR: 10 INJECTION, SOLUTION INTRAVENOUS at 03:46

## 2020-01-01 RX ADMIN — SOTALOL HYDROCHLORIDE 80 MG: 80 TABLET ORAL at 20:46

## 2020-01-01 RX ADMIN — FUROSEMIDE 15 MG/HR: 10 INJECTION, SOLUTION INTRAVENOUS at 03:36

## 2020-01-01 RX ADMIN — CARVEDILOL 12.5 MG: 12.5 TABLET, FILM COATED ORAL at 16:04

## 2020-01-01 RX ADMIN — CARVEDILOL 12.5 MG: 12.5 TABLET, FILM COATED ORAL at 07:38

## 2020-01-01 RX ADMIN — SOTALOL HYDROCHLORIDE 80 MG: 80 TABLET ORAL at 20:34

## 2020-01-01 RX ADMIN — Medication 400 MG: at 21:53

## 2020-01-01 RX ADMIN — SPIRONOLACTONE 25 MG: 25 TABLET ORAL at 07:38

## 2020-01-01 RX ADMIN — Medication 400 MG: at 08:25

## 2020-01-01 RX ADMIN — FUROSEMIDE 15 MG/HR: 10 INJECTION, SOLUTION INTRAVENOUS at 23:44

## 2020-01-01 RX ADMIN — POTASSIUM CHLORIDE 20 MEQ: 20 TABLET, EXTENDED RELEASE ORAL at 08:03

## 2020-01-01 RX ADMIN — SPIRONOLACTONE 25 MG: 25 TABLET ORAL at 07:53

## 2020-01-01 RX ADMIN — Medication 2 TABLET: at 07:53

## 2020-01-01 RX ADMIN — FERROUS SULFATE TAB 325 MG (65 MG ELEMENTAL FE) 325 MG: 325 (65 FE) TAB at 16:27

## 2020-01-01 RX ADMIN — Medication 2 TABLET: at 08:45

## 2020-01-01 RX ADMIN — CARVEDILOL 12.5 MG: 12.5 TABLET, FILM COATED ORAL at 07:53

## 2020-01-01 RX ADMIN — FUROSEMIDE 15 MG/HR: 10 INJECTION, SOLUTION INTRAVENOUS at 19:00

## 2020-01-01 RX ADMIN — POTASSIUM CHLORIDE 40 MEQ: 20 TABLET, EXTENDED RELEASE ORAL at 21:54

## 2020-01-01 RX ADMIN — SPIRONOLACTONE 25 MG: 25 TABLET ORAL at 08:44

## 2020-01-01 RX ADMIN — Medication 400 MG: at 08:22

## 2020-01-01 RX ADMIN — SOTALOL HYDROCHLORIDE 80 MG: 80 TABLET ORAL at 20:13

## 2020-01-01 RX ADMIN — METOLAZONE 5 MG: 5 TABLET ORAL at 08:22

## 2020-01-01 RX ADMIN — FUROSEMIDE 10 MG/HR: 10 INJECTION, SOLUTION INTRAVENOUS at 21:52

## 2020-01-01 RX ADMIN — LEVOTHYROXINE SODIUM 25 MCG: 25 TABLET ORAL at 08:03

## 2020-01-01 RX ADMIN — CARVEDILOL 12.5 MG: 12.5 TABLET, FILM COATED ORAL at 15:01

## 2020-01-01 RX ADMIN — SOTALOL HYDROCHLORIDE 80 MG: 80 TABLET ORAL at 08:44

## 2020-01-01 RX ADMIN — POTASSIUM CHLORIDE 20 MEQ: 20 TABLET, EXTENDED RELEASE ORAL at 08:53

## 2020-01-01 RX ADMIN — FUROSEMIDE 15 MG/HR: 10 INJECTION, SOLUTION INTRAVENOUS at 14:14

## 2020-01-01 RX ADMIN — CARVEDILOL 12.5 MG: 12.5 TABLET, FILM COATED ORAL at 17:39

## 2020-01-01 RX ADMIN — LEVOTHYROXINE SODIUM 25 MCG: 25 TABLET ORAL at 07:53

## 2020-01-01 RX ADMIN — FERROUS SULFATE TAB 325 MG (65 MG ELEMENTAL FE) 325 MG: 325 (65 FE) TAB at 07:37

## 2020-01-01 RX ADMIN — CARVEDILOL 12.5 MG: 12.5 TABLET, FILM COATED ORAL at 08:03

## 2020-01-01 RX ADMIN — SOTALOL HYDROCHLORIDE 80 MG: 80 TABLET ORAL at 21:00

## 2020-01-01 RX ADMIN — Medication 2 TABLET: at 07:39

## 2020-01-01 RX ADMIN — LEVOTHYROXINE SODIUM 25 MCG: 25 TABLET ORAL at 07:38

## 2020-01-01 RX ADMIN — Medication 2 TABLET: at 08:25

## 2020-01-01 RX ADMIN — POTASSIUM CHLORIDE 20 MEQ: 20 TABLET, EXTENDED RELEASE ORAL at 07:38

## 2020-01-01 RX ADMIN — FUROSEMIDE 15 MG/HR: 10 INJECTION, SOLUTION INTRAVENOUS at 21:00

## 2020-01-01 RX ADMIN — LEVOTHYROXINE SODIUM 25 MCG: 25 TABLET ORAL at 08:08

## 2020-01-01 RX ADMIN — FERROUS SULFATE TAB 325 MG (65 MG ELEMENTAL FE) 325 MG: 325 (65 FE) TAB at 16:04

## 2020-01-01 RX ADMIN — Medication 400 MG: at 07:53

## 2020-01-01 RX ADMIN — Medication 2 TABLET: at 08:03

## 2020-01-01 RX ADMIN — Medication 2 TABLET: at 08:22

## 2020-01-01 RX ADMIN — TORSEMIDE 20 MG: 20 TABLET ORAL at 11:12

## 2020-01-01 RX ADMIN — SOTALOL HYDROCHLORIDE 80 MG: 80 TABLET ORAL at 08:25

## 2020-01-01 RX ADMIN — LEVOTHYROXINE SODIUM 25 MCG: 25 TABLET ORAL at 08:45

## 2020-01-01 RX ADMIN — Medication 400 MG: at 08:03

## 2020-01-01 RX ADMIN — CARVEDILOL 12.5 MG: 12.5 TABLET, FILM COATED ORAL at 16:55

## 2020-01-01 RX ADMIN — TORSEMIDE 40 MG: 20 TABLET ORAL at 08:08

## 2020-01-01 RX ADMIN — CARVEDILOL 12.5 MG: 12.5 TABLET, FILM COATED ORAL at 08:25

## 2020-01-01 RX ADMIN — CHLOROTHIAZIDE SODIUM 500 MG: 500 INJECTION, POWDER, LYOPHILIZED, FOR SOLUTION INTRAVENOUS at 11:10

## 2020-01-01 RX ADMIN — WARFARIN SODIUM 10 MG: 5 TABLET ORAL at 08:08

## 2020-01-01 RX ADMIN — SPIRONOLACTONE 25 MG: 25 TABLET ORAL at 08:25

## 2020-01-01 RX ADMIN — CARVEDILOL 12.5 MG: 12.5 TABLET, FILM COATED ORAL at 08:45

## 2020-01-01 RX ADMIN — FERROUS SULFATE TAB 325 MG (65 MG ELEMENTAL FE) 325 MG: 325 (65 FE) TAB at 08:08

## 2020-01-01 RX ADMIN — FUROSEMIDE 15 MG/HR: 10 INJECTION, SOLUTION INTRAVENOUS at 18:23

## 2020-01-01 RX ADMIN — SOTALOL HYDROCHLORIDE 80 MG: 80 TABLET ORAL at 08:22

## 2020-01-01 RX ADMIN — CHLOROTHIAZIDE SODIUM 500 MG: 500 INJECTION, POWDER, LYOPHILIZED, FOR SOLUTION INTRAVENOUS at 12:00

## 2020-01-01 RX ADMIN — LEVOTHYROXINE SODIUM 25 MCG: 25 TABLET ORAL at 08:22

## 2020-01-01 RX ADMIN — SPIRONOLACTONE 25 MG: 25 TABLET ORAL at 13:43

## 2020-01-01 SDOH — HEALTH STABILITY: MENTAL HEALTH: HOW OFTEN DO YOU HAVE A DRINK CONTAINING ALCOHOL?: NEVER

## 2020-01-01 ASSESSMENT — ENCOUNTER SYMPTOMS
WHEEZING: 0
RESPIRATORY NEGATIVE: 1
CHEST TIGHTNESS: 0
STRIDOR: 0
NAUSEA: 0
GASTROINTESTINAL NEGATIVE: 1
COUGH: 0
COUGH: 0
SHORTNESS OF BREATH: 0
NAUSEA: 0
SHORTNESS OF BREATH: 0
WHEEZING: 0
CHEST TIGHTNESS: 0
COUGH: 0
WHEEZING: 0
GASTROINTESTINAL NEGATIVE: 1
STRIDOR: 0
BLOOD IN STOOL: 0
STRIDOR: 0
SHORTNESS OF BREATH: 1
GASTROINTESTINAL NEGATIVE: 1
STRIDOR: 0
STRIDOR: 0
CHEST TIGHTNESS: 0
CHEST TIGHTNESS: 0
NAUSEA: 0
NAUSEA: 0
EYES NEGATIVE: 1
SHORTNESS OF BREATH: 1
GASTROINTESTINAL NEGATIVE: 1
BLOOD IN STOOL: 0
CHEST TIGHTNESS: 0
EYES NEGATIVE: 1
BLOOD IN STOOL: 0
EYES NEGATIVE: 1
ABDOMINAL DISTENTION: 1
WHEEZING: 0
STRIDOR: 0
COUGH: 0
COUGH: 0
EYES NEGATIVE: 1
SHORTNESS OF BREATH: 1
EYES NEGATIVE: 1
NAUSEA: 0
GASTROINTESTINAL NEGATIVE: 1
RESPIRATORY NEGATIVE: 1
WHEEZING: 0
WHEEZING: 0
COUGH: 0
CHEST TIGHTNESS: 0
EYES NEGATIVE: 1
BLOOD IN STOOL: 0
SHORTNESS OF BREATH: 1
NAUSEA: 0

## 2020-01-01 ASSESSMENT — PAIN SCALES - GENERAL
PAINLEVEL_OUTOF10: 0

## 2020-01-28 PROBLEM — I50.43 CHF (CONGESTIVE HEART FAILURE), NYHA CLASS I, ACUTE ON CHRONIC, COMBINED (HCC): Status: ACTIVE | Noted: 2020-01-01

## 2020-01-29 NOTE — CONSULTS
[address]                      [facility], [address] [facility]                                  CONSULTATION    PATIENT NAME: Jane Jimenez                   :        1950  MED REC NO:   [id number]                            ROOM:       W167  ACCOUNT NO:   [de-identified]                           ADMIT DATE: 2020  PROVIDER:     Pinky Feldman DO    CONSULT DATE:  2020    HISTORY OF PRESENT ILLNESS:  A 43-year-old  female admitted to  the hospital with increasing shortness of breath and swelling of her  lower extremities to her thighs. The patient has had some 60 pounds  weight gain over the past few months. She has a history of chronic  kidney disease for many years. She has been running a GFR of 16 to 18  over the past 6 months, and is admitted at this time to the hospital  with a serum creatinine of 4.2 and GFR of 10.5. The patient states 18  years ago she had an aortic valve replacement, prosthetic. She is on  warfarin therapy. She denies any use of nonsteroidal anti-inflammatory  medication. She has no recent contrast dye exposure. She denies any  urinary tract infections or frequency. No gross hematuria. On  questioning her, she admits that 8 years ago she was told kidney  disease, but followed by her cardiologist and nothing was pursued at  that time. There is no history of diabetes and family history of kidney  disease. ALLERGIES TO MEDICATIONS:  ATORVASTATIN, NIACIN. HABITS:  No smoking, no alcohol use. No opioids or nonsteroidals. PAST SURGICAL HISTORY:  Aortic valve prosthetic replacement. She denies  coronary artery disease. PAST MEDICAL HISTORY:  Chronic atrial fibrillation, aortic valve  prosthetic, hyperlipidemia, hypertension, chronic anticoagulant therapy,  varicose veins; lower limbs. REVIEW OF SYSTEMS:  Unremarkable. FAMILY HISTORY:  Noncontributory.     MEDICATIONS AT THE TIME OF ADMISSION:

## 2020-01-29 NOTE — CARE COORDINATION
related to the following treatment goals: LESS SWELLING    Initial Discharge Plan? (Note: please see concurrent daily documentation for any updates after initial note). HOME, DECLINED NEEDS AND NEED FOR HHC.      The Patient and/or patient representative: PATIENT was provided with choice of any post-acute providers for care and equipment and agrees with discharge plan  Yes    Electronically signed by MariaL uisa Sauceda RN on 1/29/2020 at 12:57 PM

## 2020-01-29 NOTE — PROGRESS NOTES
[facility] OCCUPATIONAL THERAPY EVALUATION - ACUTE     NAME: Rosie Alva  : 1950 (77 y.o.)  MRN: [id number]  CODE STATUS: No Order  Room: [place] [id number]    Date of Service: 2020    Patient Diagnosis(es): CHF (congestive heart failure), NYHA class I, acute on chronic, combined ([facility] [I50.43]   No chief complaint on file. Patient Active Problem List    Diagnosis Date Noted    H/O prosthetic aortic valve replacement      Priority: High    CHF (congestive heart failure), NYHA class I, acute on chronic, combined ([facility] 2020    Varicose veins of both lower extremities 2019    On anticoagulant therapy 2017    Hyperlipidemia     Hypertension     CAD (coronary artery disease)     Chronic atrial fibrillation 2016    Pure hypercholesterolemia 2014    Essential hypertension 2011        Past Medical History:   Diagnosis Date    A-fib [facility])     CAD (coronary artery disease)     Hyperlipidemia     Hypertension      Past Surgical History:   Procedure Laterality Date    CARDIAC CATHETERIZATION      CARDIAC VALVE REPLACEMENT      st judes valve replacment of aortic valve. Restrictions:Catheter        Safety Devices: Safety Devices  Safety Devices in place: Not Applicable    Subjective:Pt seen with spouse present.        Pain Reassessment: 0/10          Prior Level of Function:  Social/Functional History  Lives With: Spouse  Type of Home: House  Home Layout: One level  Home Access: Stairs to enter without rails  Entrance Stairs - Number of Steps: 1  Bathroom Shower/Tub: Tub/Shower unit  Bathroom Equipment: (NA)  Home Equipment: Rolling walker  ADL Assistance: Independent  Homemaking Assistance: Independent  Homemaking Responsibilities: Yes  Ambulation Assistance: Independent  Transfer Assistance: Independent  Additional Comments: Pt reports completely independent until 5 weeks ago    OBJECTIVE:     Orientation Status:  Orientation  Overall Orientation

## 2020-01-29 NOTE — H&P
History and Physical  Patient: Doni [facility]  Unit/Bed: X706/M373-62  YOB: 1950  MRN: [id number]  Acct: [de-identified]   Admitting Diagnosis: CHF (congestive heart failure), NYHA class I, acute on chronic, combined ([facility] [I50.43]  Admit Date:  1/28/2020  Hospital Day: 1      Chief Complaint: Advanced CKD, Anasarca, DHF AVR      History of Present Illness: Pt presented to my office yesterday marked volume overloaded and Dyspneic with basic ADLs. Has been going on for sometime now but has progressed. She has been minimally active as a result. No CP Dizzinees Bleeding nor falls reported. She has gained 60 LBS in recent months from water retention. Out pt Diuretic management was little help. She was advised to get admitted yesterday but was reluctant for a long time. She stayed in my office over an hour until we can finally get a decision. She has had Redo Sternotomy for Severe AS. Initially in 1978 she had AV Volvotomy with subsequent 2002 Mechanical [facility] Serg AVR. INR yesterday 3.2. This AM 4.1 despite holding Warfarin last PM.    She was placed on Lasix gtt 10/hr. She put out ~ 850 cc over last 12 hrs or so. I have reviewed Dr. Phoebe Bates last Office Notes- it appears pt has significant Nephrotic range Proteinuria. Pt is mildly Pnacytopenic WBC HB and PLT. EKG: AF  PMHx:  Past Medical History:   Diagnosis Date    A-fib ([facility]     CAD (coronary artery disease)     Hyperlipidemia     Hypertension        PSHx:  Past Surgical History:   Procedure Laterality Date    CARDIAC CATHETERIZATION  2002    CARDIAC VALVE REPLACEMENT      st judes valve replacment of aortic valve.         Social Hx:  Social History     Socioeconomic History    Marital status:      Spouse name: None    Number of children: None    Years of education: None    Highest education level: None   Occupational History    None   Social Needs    Financial resource strain: None    Food insecurity: Oral Daily Carlos Spencer MD   400 mg at 01/28/20 2153    carvedilol (COREG) tablet 12.5 mg  12.5 mg Oral BID WC Carlos Spencer MD   12.5 mg at 01/28/20 2153    levothyroxine (SYNTHROID) tablet 25 mcg  25 mcg Oral Daily Carlos Spencer MD        sotalol (BETAPACE) tablet 80 mg  80 mg Oral BID Calros Spencer MD   80 mg at 01/28/20 2154       Review of Systems:   Review of Systems   Constitutional: Negative. Negative for diaphoresis and fatigue. HENT: Negative. Eyes: Negative. Respiratory: Positive for shortness of breath. Negative for cough, chest tightness, wheezing and stridor. Cardiovascular: Positive for leg swelling. Negative for chest pain and palpitations. Gastrointestinal: Positive for abdominal distention. Negative for blood in stool and nausea. Genitourinary: Negative. Musculoskeletal: Positive for arthralgias. Skin: Negative. Neurological: Positive for weakness. Negative for dizziness, syncope and light-headedness. Hematological: Negative. Psychiatric/Behavioral: Negative. Physical Examination:    /71   Pulse 73   Temp 97.7 °F (36.5 °C) (Oral)   Resp 20   Ht 5' 7\" (1.702 m)   Wt 210 lb 8.6 oz (95.5 kg)   SpO2 99%   BMI 32.98 kg/m²    Physical Exam   Constitutional: No distress. She appears chronically ill and acutely ill. HENT:   Normal cephalic and Atraumatic   Eyes: Pupils are equal, round, and reactive to light. Neck: Normal range of motion and thyroid normal. Neck supple. No JVD present. No neck adenopathy. No thyromegaly present. Cardiovascular: Normal rate and normal heart sounds. An irregularly irregular rhythm present. Exam reveals decreased pulses. Pulmonary/Chest: Effort normal. She has no wheezes. She has bibasilar rales. She exhibits no tenderness. Abdominal: Soft. Bowel sounds are normal. There is no abdominal tenderness. Musculoskeletal: Normal range of motion. General: Edema (3+) present. No tenderness.    Neurological: She is alert and oriented to person, place, and time. Skin: Skin is warm. No cyanosis. Nails show no clubbing. LABS:  CBC:   Lab Results   Component Value Date    WBC 3.2 01/29/2020    RBC 3.96 01/29/2020    HGB 10.8 01/29/2020    HCT 33.2 01/29/2020    MCV 83.7 01/29/2020    MCH 27.2 01/29/2020    MCHC 32.4 01/29/2020    RDW 18.0 01/29/2020     01/29/2020     CBC with Differential:    Lab Results   Component Value Date    WBC 3.2 01/29/2020    RBC 3.96 01/29/2020    HGB 10.8 01/29/2020    HCT 33.2 01/29/2020     01/29/2020    MCV 83.7 01/29/2020    MCH 27.2 01/29/2020    MCHC 32.4 01/29/2020    RDW 18.0 01/29/2020     CMP:    Lab Results   Component Value Date     01/29/2020    K 4.2 01/29/2020     01/29/2020    CO2 18 01/29/2020    BUN 43 01/29/2020    CREATININE 4.11 01/29/2020    GFRAA 13.1 01/29/2020    LABGLOM 10.8 01/29/2020    GLUCOSE 91 01/29/2020    GLUCOSE 108 01/07/2020    PROT 6.7 01/28/2020    LABALBU 3.6 01/28/2020    LABALBU 3.9 01/07/2020    CALCIUM 8.7 01/29/2020    BILITOT 0.7 01/28/2020    ALKPHOS 137 01/28/2020    AST 12 01/28/2020    ALT 8 01/28/2020     BMP:    Lab Results   Component Value Date     01/29/2020    K 4.2 01/29/2020     01/29/2020    CO2 18 01/29/2020    BUN 43 01/29/2020    LABALBU 3.6 01/28/2020    LABALBU 3.9 01/07/2020    CREATININE 4.11 01/29/2020    CALCIUM 8.7 01/29/2020    GFRAA 13.1 01/29/2020    LABGLOM 10.8 01/29/2020    GLUCOSE 91 01/29/2020    GLUCOSE 108 01/07/2020     Magnesium:    Lab Results   Component Value Date    MG 2.5 01/28/2020     Troponin:  No results found for: [facility] Problems    Diagnosis Date Noted    CHF (congestive heart failure), NYHA class I, acute on chronic, [facility] [I50.43] 01/28/2020        Assessment/Plan:  1. Acute on Chronic DHF severe Volume overload and advanced CKD - I spoke with Dr. Jean-Paul Foy yesterday.   Will admit pt and attempt aggressive diuresis and follow Labs closely. I have discussed with pt and  in detail regarding the advanced state of her Kidneys and that she may be approaching Dialysis if not now then in the near future. We will advance Lasix gtt today to 15/hr and give 1 dose of Metolazone 5 mg. Continue K Replacement. 2. Decreased BS- Will get PA/LAT CXR  3. Persistent AF- on Sotalol QTc is 495. Keep dose at 80 bid for now. Rate is reasonable   4. AVR- hold Warfarin. Follow INR. If need will start Heparin gtt. She may need Kidney Biposy? Echo- P  5. CKD- will obtain Renal Duplex. Nephrology Consult. 6. HTN Stable   7. Pancytopenia- will observe. Low threshold for Hematology evaluation. 8. Add Incentive Spirometry  9.  PTOT     Electronically signed by Hood Connor MD on 1/29/2020 at 8:10 AM

## 2020-01-29 NOTE — CARE COORDINATION
Care Transition Nurse provided CHF booklet and zone sheet and reviewed with pt and spouse. Discussed causes of CHF, symptoms, daily weights, BP management, and medication compliance. Explained the importance of weighing self daily and phoning cardiologist for weight gain of 3 or more pounds in 1-7 days, Patient states she has a scale, discussed importance of early symptom management in preventing hospitalization and stressed importance of daily weights as an early indicator of fluid overload, suggested she weigh herself daily and keep log to note fluctuations or increases over 7 day period. Pt states she has gained over 50 pounds in past few months, educated on significance of weight gain in relation to fluid overload and CHF, stressed that she will have to monitor weight gain closely and call clinician promptly with any increase over 3 pounds in less than a week. Advised she monitor BP daily at home, Pt states she has a BP cuff at home, suggested she obtain BP and weights at same time in AM, and log to note fluctuation and for review with clinician, stressed calling clinician with abnormal readings. Reminded to monitor for symptoms in yellow zone including sob, edema, increased fatigue, weight gain,difficulty laying flat or needing more pillows to sleep, increased abdominal girth, and decreased appetite and advised she should contact clinician promptly with any of these symptoms. Educated on importance of following a low sodium diet, Explained that she should read food labels for sodium content and not consume more than 2000 mg per day,suggested she review nutrition section of CHF book for healthier alternatives for low sodium diet and reminded her that restaurant and processed food are usually higher in sodium content. I advised use of salt free seasoning alternatives made of herbs and spices to make food taste better without increasing sodium content.  Spouse inquired about low sodium salad dressing, because they enjoy salads and want to know what dressing they can use, suggested oil and vinegar dressing, will discuss with dietician to inquire about what other low sodium option they have for salad dressing, pt inquired about use of [address] salt instead of regular salt, discussed with dietician and she reports [address] salt is made up of sodium as well. Will review with pt and spouse. Discussed limiting fluids to 8 glasses a day, unless otherwise directed by clinicians. Educated on importance of medication compliance. Pt states she a follows medication regimen closely and states she does not have any issues with obtaining prescription medications. Stressed importance of follow up with clinician within one week of discharge and of phoning physician promptly for symptoms in the yellow zone and EMS for symptoms in the red zone. Patient voiced understanding.

## 2020-01-30 NOTE — PROGRESS NOTES
Physical Therapy   Facility/Department: [facility] MED SURG E362/Z110-01    NAME: Gaviota Green    : 1950 (77 y.o.)  MRN: [id number]    Account: [de-identified]  Gender: female    PT evaluation and treatment orders received. Chart reviewed. PT eval attempted. Patient Unavailable: Family requested come back later. Will attempt PT evaluation again at earliest convenience.       Electronically signed by Shanta Fitzpatrick on 20 at 2:08 PM

## 2020-01-30 NOTE — PROGRESS NOTES
Progress Note  Patient: Ange Fulton  Unit/Bed: Q747/R514-20  YOB: 1950  MRN: [id number]  Acct: [de-identified]   Admitting Diagnosis: CHF (congestive heart failure), NYHA class I, [facility] on chronic, [facility] ([facility] [I50.43]  Admit Date:  1/28/2020  Hospital Day: 2    Chief Complaint: CKD/MARCELL HF SOB AVR    Histories:  Past Medical History:   Diagnosis Date    A-fib [facility])     CAD (coronary artery disease)     Hyperlipidemia     Hypertension      Past Surgical History:   Procedure Laterality Date    CARDIAC CATHETERIZATION  2002    CARDIAC VALVE REPLACEMENT      st judes valve replacment of aortic valve.       Family History   Problem Relation Age of Onset    Heart Attack Mother     Heart Attack Father     Heart Disease Brother      Social History     Socioeconomic History    Marital status:      Spouse name: None    Number of children: None    Years of education: None    Highest education level: None   Occupational History    None   Social Needs    Financial resource strain: None    Food insecurity:     Worry: None     Inability: None    Transportation needs:     Medical: None     Non-medical: None   Tobacco Use    Smoking status: Never Smoker    Smokeless tobacco: Never Used   Substance and Sexual Activity    Alcohol use: Never     Frequency: Never    Drug use: No    Sexual activity: None   Lifestyle    Physical activity:     Days per week: None     Minutes per session: None    Stress: None   Relationships    Social connections:     Talks on phone: None     Gets together: None     Attends [employer] service: None     Active member of club or organization: None     Attends meetings of clubs or organizations: None     Relationship status: None    Intimate partner violence:     Fear of current or ex partner: None     Emotionally abused: None     Physically abused: None     Forced sexual activity: None   Other Topics Concern    None   Social History Narrative    None 01/30/2020    MCH 26.6 01/30/2020    MCHC 32.1 01/30/2020    RDW 18.1 01/30/2020    PLT 95 01/30/2020     CBC with Differential:    Lab Results   Component Value Date    WBC 2.9 01/30/2020    RBC 3.90 01/30/2020    HGB 10.4 01/30/2020    HCT 32.4 01/30/2020    PLT 95 01/30/2020    MCV 83.0 01/30/2020    MCH 26.6 01/30/2020    MCHC 32.1 01/30/2020    RDW 18.1 01/30/2020     CMP:    Lab Results   Component Value Date     01/30/2020    K 4.7 01/30/2020     01/30/2020    CO2 20 01/30/2020    BUN 45 01/30/2020    CREATININE 4.36 01/30/2020    GFRAA 12.2 01/30/2020    LABGLOM 10.1 01/30/2020    GLUCOSE 99 01/30/2020    GLUCOSE 108 01/07/2020    PROT 6.7 01/28/2020    LABALBU 3.6 01/28/2020    LABALBU 3.9 01/07/2020    CALCIUM 8.8 01/30/2020    BILITOT 0.7 01/28/2020    ALKPHOS 137 01/28/2020    AST 12 01/28/2020    ALT 8 01/28/2020     BMP:    Lab Results   Component Value Date     01/30/2020    K 4.7 01/30/2020     01/30/2020    CO2 20 01/30/2020    BUN 45 01/30/2020    LABALBU 3.6 01/28/2020    LABALBU 3.9 01/07/2020    CREATININE 4.36 01/30/2020    CALCIUM 8.8 01/30/2020    GFRAA 12.2 01/30/2020    LABGLOM 10.1 01/30/2020    GLUCOSE 99 01/30/2020    GLUCOSE 108 01/07/2020     Magnesium:    Lab Results   Component Value Date    MG 2.5 01/28/2020     Troponin:  No results found for: [facility] Problems    Diagnosis Date Noted    CHF (congestive heart failure), NYHA class I, acute on chronic, combined ([facility] [I50.43] 01/28/2020        Assessment/Plan:  1. A/C DHF - diuresing well. GFR 10.1. Will continue Current strategy today along w 1 more dose Metolazone 5 mg. Follow labs closely. 2. CKD/MARCELL - Renal US shows B/L Contracted Kidneys R>L. 3. CXR fromo yesterday shows B/L Pleural effusion. Exam today is improved. 4. HTN stable  5. AF- reamins in AF. Rate controlled. QTc slight prolonged. (502) Lytes WNL  [facility]. [facility]2 despite holding Warfarin past 2 days.   Will

## 2020-01-30 NOTE — PROGRESS NOTES
Renal Progress Note    Assessment and Plan:    1. ckd stage 5   2. Fluid overload   3. htn   4. Anemia     Agree with management by cardiology. High dose diuretics lasix drip and metolazone  k is stable  hgb over 10  No change  Would likely want couple more days on lasix drip      Patient Active Problem List:     Chronic atrial fibrillation     Essential hypertension     Pure hypercholesterolemia     Hyperlipidemia     Hypertension     CAD (coronary artery disease)     H/O prosthetic aortic valve replacement     On anticoagulant therapy     Varicose veins of both lower extremities     CHF (congestive heart failure), NYHA class I, acute on chronic, combined ([facility]      Subjective:   Admit Date: 1/28/2020    Interval History: good uo. Almost 5 liters. Weight is down. I checked weight at office visit in may and was 165. Over 200 now. Medications:   Scheduled Meds:   [START ON 1/31/2020] potassium chloride  20 mEq Oral Daily with breakfast    spironolactone  25 mg Oral Daily    sennosides-docusate sodium  2 tablet Oral Daily    calcium citrate-vitamin D  2 tablet Oral Daily    magnesium oxide  400 mg Oral Daily    carvedilol  12.5 mg Oral BID WC    levothyroxine  25 mcg Oral Daily    sotalol  80 mg Oral BID     Continuous Infusions:   furosemide (LASIX) 1mg/ml infusion 15 mg/hr (01/30/20 1110)       CBC:   Recent Labs     01/29/20  0559 01/30/20  0556   WBC 3.2* 2.9*   HGB 10.8* 10.4*   * 95*     CMP:    Recent Labs     01/28/20  1908 01/29/20  0559 01/30/20  0556    132* 141   K 4.2 4.2 4.7    101 103   CO2 18* 18* 20   BUN 41* 43* 45*   CREATININE 4.22* 4.11* 4.36*   GLUCOSE 139* 91 99   CALCIUM 8.8 8.7 8.8   LABGLOM 10.5* 10.8* 10.1*     Troponin: No results for input(s): TROPONINI in the last 72 hours. BNP: No results for input(s): BNP in the last 72 hours.   INR:   Recent Labs     01/30/20  0556   INR 4.2     Lipids: No results for input(s): CHOL, LDLDIRECT, TRIG, HDL,

## 2020-01-31 NOTE — PROGRESS NOTES
Nutrition Education    Type and Reason for Visit: Consult, Patient Education    Nutrition Assessment:  CHF education. Met with pt and spouse to review material provided by [facility], pt states she has read throught the entire packet, feels comfortable with label reading and making low sodium choices at the grocery store. Pt does eat out frequently and tries to ' eat light' with soup and salads. Looked up nutrition information for [employer], [employer] and discussed ways to reduce sodium intkae, as most of her meal choices were ~ 2,000 mg of sodium   CHF diet education completed per protocol. Reviewed high sodium foods to avoid and low sodium foods recommended. Utilizing food label information included as well as handout on alternative spices/seasonings provided. · Verbally reviewed information with pt, spouse, 2gNa  · Written educational materials provided. · Contact name and number provided. · Refer to Patient Education activity for more details.     Electronically signed by Mony Mares RD, LD on 1/31/20 at 11:05 AM

## 2020-01-31 NOTE — PROGRESS NOTES
anchored into stud). )  Home Equipment: Rolling walker(doesn't use it)  ADL Assistance: Independent  Homemaking Assistance: Independent  Homemaking Responsibilities: Yes  Ambulation Assistance: Independent  Transfer Assistance: Independent  Additional Comments: Pt reports completely independent until 5 weeks ago    OBJECTIVE:   Vision: Impaired  Vision Exceptions: Wears glasses at all times  Hearing: Within functional limits    Cognition:  Overall Orientation Status: Within Functional Limits  Follows Commands: Within Functional Limits    Observation/Palpation  Posture: Good  Edema: bilateral LE pitting edema 2-3+      Strength:  Strength RLE  Strength RLE: WFL  Strength LLE  Strength LLE: WFL    Neuro:  Balance  Posture: Good  Sitting - Static: Good  Sitting - Dynamic: Good  Standing - Static: Good  Standing - Dynamic: Good        Sensation  Overall Sensation Status: WFL    Bed mobility  Comment: NT- EOB upon arrival    Transfers  Sit to Stand: Modified independent  Stand to sit: Modified independent  Comment: Use of bedrail/walker    Ambulation  Ambulation?: Yes  Ambulation 1  Surface: level tile  Device: Rolling Walker  Assistance: Stand by assistance;Supervision  Quality of Gait: Pt ambulates with decreased step length and gait speed. Distance: 75ft w/ [employer]  Comments: Pt tolerated ambulation well w/ no evidance or report of fatigue. Post ambulation RPE 2-3/10. Did not test ambulation with no AD (Pt baseline). Discussed w/ Pt potential use of WW at home- agreed she is safe w/out an AD, but to use WW if status change. Activity Tolerance  Activity Tolerance: Patient Tolerated treatment well          PT Education  PT Education: PT Role;General Safety; Family Education;Equipment; Functional Mobility Training    ASSESSMENT:   Body structures, Functions, Activity limitations: Decreased strength;Decreased endurance  Decision Making: Low Complexity  History: High  Exam: Low  Clinical Presentation: Medium

## 2020-01-31 NOTE — PROGRESS NOTES
Wound Ostomy Continence Nursing    This [id number] [facility] Nurse consulted for evaluation of Left forearm abrasion. According to patient, she tripped outside over a curb, which resulted in an abrasion to the left forearm. Per patient, went to [facility] where the wound was cleaned and bandaged. Patient declined dressing change - stating \"this bandage is fine and it is just a scratch. \"     Electronically signed by LARY Holbrook, RN, Ema Star on 1/31/2020 at 11:33 AM

## 2020-01-31 NOTE — PROGRESS NOTES
Progress Note  Patient: Rosaura Core  Unit/Bed: G862/U452-25  YOB: 1950  MRN: [id number]  Acct: [de-identified]   Admitting Diagnosis: CHF (congestive heart failure), NYHA class I, acute [facility] chronic[facility] ([facility] [I50.43]  Admit Date:  1/28/2020  Hospital Day: 3    Chief Complaint: CKD/MARCELL HF SOB  MechAVR    Histories:  Past Medical History:   Diagnosis Date    A-fib [facility])     CAD (coronary artery disease)     Hyperlipidemia     Hypertension      Past Surgical History:   Procedure Laterality Date    CARDIAC CATHETERIZATION  2002    CARDIAC VALVE REPLACEMENT      st judes valve replacment of aortic valve.       Family History   Problem Relation Age of Onset    Heart Attack Mother     Heart Attack Father     Heart Disease Brother      Social History     Socioeconomic History    Marital status:      Spouse name: None    Number of children: None    Years of education: None    Highest education level: None   Occupational History    None   Social Needs    Financial resource strain: None    Food insecurity:     Worry: None     Inability: None    Transportation needs:     Medical: None     Non-medical: None   Tobacco Use    Smoking status: Never Smoker    Smokeless tobacco: Never Used   Substance and Sexual Activity    Alcohol use: Never     Frequency: Never    Drug use: No    Sexual activity: None   Lifestyle    Physical activity:     Days per week: None     Minutes per session: None    Stress: None   Relationships    Social connections:     Talks on phone: None     Gets together: None     Attends Baptist service: None     Active member of club or organization: None     Attends meetings of clubs or organizations: None     Relationship status: None    Intimate partner violence:     Fear of current or ex partner: None     Emotionally abused: None     Physically abused: None     Forced sexual activity: None   Other Topics Concern    None   Social History Narrative    None

## 2020-02-01 NOTE — PROGRESS NOTES
Progress Note  Patient: Miranda Pérez  Unit/Bed: Y505/E691-02  YOB: 1950  MRN: [id number]  Acct: [de-identified]   Admitting Diagnosis: CHF (congestive heart failure), NYHA class I, acute on chronic, [facility] ([facility] [I50.43]  Admit Date:  1/28/2020  Hospital Day: 4    Chief Complaint: dyspnea     Subjective/HPI: 77year old female with history of AVR. Here for worsening edema and anasarca, weight gain, and congestive heart failure secondary to CKD stage 5. Feeling better, walking the halls and less edema. No chest pain. EKG:  Review of Systems:   Review of Systems      Physical Examination:    /82   Pulse 102   Temp 97.3 °F (36.3 °C) (Oral)   Resp 18   Ht 5' 7\" (1.702 m)   Wt 199 lb (90.3 kg)   SpO2 97%   BMI 31.17 kg/m²    Physical Exam   Constitutional: No distress. She appears chronically ill. HENT:   Mouth/Throat: Oropharynx is clear. Eyes: Pupils are equal, round, and reactive to light. Neck: Normal range of motion. No JVD present. Cardiovascular: Regular rhythm, S1 normal, S2 normal, normal heart sounds and intact distal pulses. Exam reveals no gallop. No murmur heard. Pulses:       Radial pulses are 2+ on the right side. Dorsalis pedis pulses are 2+ on the right side. Pulmonary/Chest: Effort normal and breath sounds normal. She has no wheezes. She has no rales. She exhibits no tenderness. Abdominal: Soft. Bowel sounds are normal.   Musculoskeletal: Normal range of motion. General: Edema present. Neurological: She is alert and oriented to person, place, and time. She has intact cranial nerves. Skin: Skin is warm and dry. No rash noted.        LABS:  CBC:   Lab Results   Component Value Date    WBC 2.9 02/01/2020    RBC 3.85 02/01/2020    HGB 10.3 02/01/2020    HCT 31.8 02/01/2020    MCV 82.6 02/01/2020    MCH 26.6 02/01/2020    MCHC 32.2 02/01/2020    RDW 17.7 02/01/2020     02/01/2020     CBC with Differential:    Lab Results 8: 07 AM

## 2020-02-01 NOTE — CONSULTS
status: Never Smoker    Smokeless tobacco: Never Used   Substance and Sexual Activity    Alcohol use: Never     Frequency: Never    Drug use: No    Sexual activity: Not on file   Lifestyle    Physical activity:     Days per week: Not on file     Minutes per session: Not on file    Stress: Not on file   Relationships    Social connections:     Talks on phone: Not on file     Gets together: Not on file     Attends Jainism service: Not on file     Active member of club or organization: Not on file     Attends meetings of clubs or organizations: Not on file     Relationship status: Not on file    Intimate partner violence:     Fear of current or ex partner: Not on file     Emotionally abused: Not on file     Physically abused: Not on file     Forced sexual activity: Not on file   Other Topics Concern    Not on file   Social History Narrative    Not on file          Current Facility-Administered Medications   Medication Dose Route Frequency Provider Last Rate Last Dose    potassium chloride (KLOR-CON M) extended release tablet 20 mEq  20 mEq Oral Daily with breakfast Tamie Lomas MD   20 mEq at 01/31/20 0825    perflutren lipid microspheres (DEFINITY) injection 1.1 mg  1 mL Intravenous ONCE PRN Tamie Lomas MD        spironolactone (ALDACTONE) tablet 25 mg  25 mg Oral Daily Everett Penaloza, DO   25 mg at 01/31/20 0825    sennosides-docusate sodium (SENOKOT-S) 8.6-50 MG tablet 2 tablet  2 tablet Oral Daily Mitch Angela DO   Stopped at 01/30/20 2022    acetaminophen (TYLENOL) tablet 650 mg  650 mg Oral Q4H PRN Tamie Lomas MD        furosemide (LASIX) 100 mg in dextrose 5 % 100 mL infusion  15 mg/hr Intravenous Continuous Tamie Lomas MD 15 mL/hr at 01/31/20 0336 15 mg/hr at 01/31/20 0336    calcium citrate-vitamin D (CITRICAL + D) 315-250 MG-UNIT per tablet 2 tablet  2 tablet Oral Daily Tamie Lomas MD   2 tablet at 01/31/20 0825    magnesium oxide (MAG-OX) tablet 400 mg  400 mg Oral Daily Allie Vasques MD   400 mg at 01/31/20 0825    carvedilol (COREG) tablet 12.5 mg  12.5 mg Oral BID WC Allie Vasques MD   12.5 mg at 01/31/20 1709    levothyroxine (SYNTHROID) tablet 25 mcg  25 mcg Oral Daily Allie Vasques MD   25 mcg at 01/31/20 0825    sotalol (BETAPACE) tablet 80 mg  80 mg Oral BID Allie Vasques MD   80 mg at 01/31/20 0825     Outpatient Medications Marked as Taking for the 1/28/20 encounter [facility] Encounter)   Medication Sig Dispense Refill    levothyroxine (SYNTHROID) 25 MCG tablet TAKE 1 TABLET DAILY 90 tablet 0    sotalol (BETAPACE) 80 MG tablet TAKE 1 TABLET TWICE A  tablet 0    Compression Stockings MISC by Does not apply route 1 pair of knee high compression stockings( Jobst) 15-20mmHG 2 each 2    Elastic Bandages & Supports (JOBST KNEE HIGH COMPRESSION SM) MISC 2 each by Does not apply route daily 1 each 2    carvedilol (COREG) 12.5 MG tablet Take 1 tablet by mouth 2 times daily (with meals) (Patient taking differently: Take 18.5 mg by mouth 2 times daily (with meals) ) 60 tablet 3    Calcium Carbonate-Vitamin D 600-400 MG-UNIT CHEW one tab qd      furosemide (LASIX) 40 MG tablet Take 40 mg by mouth daily one tab qod      Magnesium Chloride 200 MG/ML SOLN 2 tabs po qod       Allergies   Allergen Reactions    Atorvastatin     Niacin          ROS:  Unremarkable except for symptoms mentioned in HPI. PHYSICAL EXAMINATION:   VITAL SIGNS: /64   Pulse 79   Temp 97.7 °F (36.5 °C) (Oral)   Resp 18   Ht 5' 7\" (1.702 m)   Wt 203 lb 12.8 oz (92.4 kg)   SpO2 95%   BMI 31.92 kg/m²         GENERAL: In no acute distress, well- nourished, well- developed, alert and oriented to person place and time. SKIN: Warm and dry, without jaundice, ecchymoses, or petechiae.   HEENT: Normocephalic,pale conjunctivae , sclera anicteric, oral mucosa moist without lesion or exudate in the visible oral cavity or oropharynx, no epistaxis  NECK: supple ; no thyromegaly  NODES: No palpable adenopathy in the neck , bilateral supraclavicular fossae, axillary chains, or inguinal regions. LUNGS: Good inspiratory effort, no accessory muscle use, clear bilaterally, no focal wheeze, rales or rhonchi. CARDIAC: Regular rate and rhythm, normal HS  ABDOMINAL: soft, non-tender, no mass or organomegaly. MUSKL: no tenderness over ribs or spine  EXTREMITIES:2+ bilateral LE edema, no calf tenderness  NEUROLOGIC: Gait not tested No grossly apparent focal deficits. PSYCH: cooperative; pt has appropriate affect    LAB RESULTS:  Recent Results (from the past 24 hour(s))   CBC    Collection Time: 01/31/20  5:46 AM   Result Value Ref Range    WBC 3.1 (L) 4.8 - 10.8 K/uL    RBC 3.89 (L) 4.20 - 5.40 M/uL    Hemoglobin 10.4 (L) 12.0 - 16.0 g/dL    Hematocrit 32.3 (L) 37.0 - 47.0 %    MCV 83.1 82.0 - 100.0 fL    MCH 26.8 (L) 27.0 - 31.3 pg    MCHC 32.3 (L) 33.0 - 37.0 %    RDW 17.9 (H) 11.5 - 14.5 %    Platelets 97 (L) 496 - 400 K/uL   Protime-INR    Collection Time: 01/31/20  5:46 AM   Result Value Ref Range    Protime 40.6 (H) 12.3 - 14.9 sec    INR 4.0    Basic Metabolic Panel    Collection Time: 01/31/20  5:47 AM   Result Value Ref Range    Sodium 137 135 - 144 mEq/L    Potassium 4.2 3.4 - 4.9 mEq/L    Chloride 101 95 - 107 mEq/L    CO2 22 20 - 31 mEq/L    Anion Gap 14 9 - 15 mEq/L    Glucose 93 70 - 99 mg/dL    BUN 50 (H) 8 - 23 mg/dL    CREATININE 4.43 (H) 0.50 - 0.90 mg/dL    GFR Non-African American 9.9 (L) >60    GFR  12.0 (L) >60    Calcium 8.8 8.5 - 9.9 mg/dL     Recent Labs     01/28/20  2502 01/31/20  0547   COLORU Yellow  --   --    PHUR 5.5  --   --    WBCUA 0-2  --   --    RBCUA 0-2  --   --    BACTERIA Negative  --   --    CLARITYU Clear  --   --    SPECGRAV 1.009  --   --    LEUKOCYTESUR Negative  --   --    UROBILINOGEN 0.2  --   --    BILIRUBINUR Negative  --   --    BLOODU Negative  --   --    GLUCOSE  --    < > 93    < > = values in this interval not displayed. RADIOLOGY RESULTS:  Xr Chest Standard (2 Vw)    Result Date: 1/29/2020  EXAMINATION: XR CHEST (2 VW) DATE AND TIME:1/29/2020 8:15 AM CLINICAL HISTORY: Shortness of breath   SOB  COMPARISONS: None  FINDINGS: Cardiomegaly. Bilateral effusions. Increased reticular haziness. Findings suggest CHF.     CHF. Us Dup Abd Pel Retro Scrot Complete    Result Date: 1/30/2020  EXAMINATION:  RENAL DUPLEX ULTRASONOGRAPHY CLINICAL HISTORY:  ACUTE KIDNEY INJURY ON CHRONIC KIDNEY INJURY COMPARISONS:  NONE AVAILABLE TECHNIQUE:  B-mode, color flow and spectral Doppler FINDINGS:  DIRECT MEASUREMENTS RIGHT                    PSV      EDV Origin          NV Proximal      NV Mid             NV Distal          NV LEFT                   PSV        EDV Origin         NV Proximal     NV Mid             NV Distal          NV RENAL ART/AORTIC RATIO Right   Left     INDIRECT MEASUREMENTS RIGHT           Resistive Index   Acceleration Time Sup        0.65                     60 Mid        0.76                     72 Inf          0.63                     68 LEFT           Resistive Index   Acceleration Time Sup        0.67                     108 Mid        Inf              TECHNICALLY A DIFFICULT STUDY, RENAL VASCULATURE IS NOT WELL VISUALIZED. VELOCITY MEASUREMENTS NOT AVAILABLE. RIGHT KIDNEY ARTERY IS SIGNIFICANTLY CONTRACTED. LEFT KIDNEY ARTERY IS ON THE SMALL SIDE. Heath Phelps ASSESSMENT AND PLAN  1. Pancytopenia r/o B12 or folate deficiency; r/o concurrent Fe deficiency or hemolysis as factors contributing to the pt's anemia. R/o Myelodysplastic syndrome. R/o occult GI blood loss or microscopic hematuria   B12 and folate levels will be checked as well as FeTIBC and ferritin . Reticulocyte count and TSH and serum ALBIN will also be checked.   If the initial work-up is non-diagnostic, then the pt will be considered for possible bone marrow asp and Bx if the Warfarin can be temporarily withheld and  Lovenox bridging can be

## 2020-02-01 NOTE — PROGRESS NOTES
for input(s): CHOL, LDLDIRECT, TRIG, HDL, AMYLASE, LIPASE in the last 72 hours. Liver:   No results for input(s): AST, ALT, ALKPHOS, PROT, LABALBU, BILITOT in the last 72 hours. Invalid input(s): BILDIR  Iron:    Recent Labs     02/01/20  0534   FERRITIN 47.4     Urinalysis: No results for input(s): UA in the last 72 hours. Objective:   Vitals: /76   Pulse 82   Temp 98.1 °F (36.7 °C) (Oral)   Resp 18   Ht 5' 7\" (1.702 m)   Wt 199 lb (90.3 kg)   SpO2 95%   BMI 31.17 kg/m²    Wt Readings from Last 3 Encounters:   02/01/20 199 lb (90.3 kg)   01/28/20 219 lb 9.6 oz (99.6 kg)   01/14/19 183 lb 9.6 oz (83.3 kg)      24HR INTAKE/OUTPUT:      Intake/Output Summary (Last 24 hours) at 2/1/2020 1401  Last data filed at 2/1/2020 1300  Gross per 24 hour   Intake 1200 ml   Output 4400 ml   Net -3200 ml       Constitutional:  Alert, awake, no apparent distress   Skin:normal, no rash  HEENT:sclera anicteric.   Head atraumatic normocephalic  Neck:supple with no thyromegally  Cardiovascular:  S1, S2 without m/r/g   Respiratory:  CTA B without w/r/r   Abdomen: +bs, soft, nt  Ext: 3+ LE edema  Musculoskeletal:Intact  Neuro:Alert and oriented with no deficit      Electronically signed by Myriam Morales MD on 2/1/2020 at 2:01 PM

## 2020-02-02 NOTE — PROGRESS NOTES
Progress Note  Patient: Rosaura Core  Unit/Bed: C810/T107-49  YOB: 1950  MRN: [id number]  Acct: [de-identified]   Admitting Diagnosis: CHF (congestive heart failure), NYHA class I, [facility] chronic[facility] [I50.43]  Admit Date:  1/28/2020  Hospital Day: 5    Chief Complaint: dyspnea     Subjective/HPI: 77year old female with history of AVR. Here for worsening edema and anasarca, weight gain, and congestive heart failure secondary to CKD stage 5. Doing well today, no chest pain or shortness of breath. EKG:  Review of Systems:   Review of Systems      Physical Examination:    /60   Pulse 71   Temp 98.1 °F (36.7 °C)   Resp 18   Ht 5' 7\" (1.702 m)   Wt 187 lb 8 oz (85 kg)   SpO2 97%   BMI 29.37 kg/m²    Physical Exam   Constitutional: No distress. She appears chronically ill. HENT:   Mouth/Throat: Oropharynx is clear. Eyes: Pupils are equal, round, and reactive to light. Neck: Normal range of motion. No JVD present. Cardiovascular: Regular rhythm, S1 normal, S2 normal, normal heart sounds and intact distal pulses. Exam reveals no gallop. No murmur heard. Pulses:       Radial pulses are 2+ on the right side. Dorsalis pedis pulses are 2+ on the right side. Pulmonary/Chest: Effort normal and breath sounds normal. She has no wheezes. She has no rales. She exhibits no tenderness. Abdominal: Soft. Bowel sounds are normal.   Musculoskeletal: Normal range of motion. General: Edema present. Neurological: She is alert and oriented to person, place, and time. She has intact cranial nerves. Skin: Skin is warm and dry. No rash noted.        LABS:  CBC:   Lab Results   Component Value Date    WBC 3.4 02/02/2020    RBC 3.88 02/02/2020    HGB 10.4 02/02/2020    HCT 32.3 02/02/2020    MCV 83.3 02/02/2020    MCH 26.7 02/02/2020    MCHC 32.1 02/02/2020    RDW 17.7 02/02/2020    PLT 97 02/02/2020     CBC with Differential:    Lab Results   Component Value Date [employer], Tibbie   on 2/2/2020 at 7:50 AM

## 2020-02-02 NOTE — PROGRESS NOTES
Renal Progress Note    Assessment and Plan:    1. ckd stage 5   2. Fluid overload   3. htn   4. Anemia     Agree with hematology eval  Cont lasix drip 1 more day with plan to d/c tomorrow on torsemide. Probably 50 daily      Patient Active Problem List:     Chronic atrial fibrillation     Essential hypertension     Pure hypercholesterolemia     Hyperlipidemia     Hypertension     CAD (coronary artery disease)     H/O prosthetic aortic valve replacement     On anticoagulant therapy     Varicose veins of both lower extremities     CHF (congestive heart failure), NYHA class I, acute on chronic, combined ([facility]      Subjective:   Admit Date: 1/28/2020    Interval History: remains with very good uo. Almost 5 liters a day.  gfr not great but stable. Feels better. Medications:   Scheduled Meds:   potassium chloride  20 mEq Oral Daily with breakfast    spironolactone  25 mg Oral Daily    sennosides-docusate sodium  2 tablet Oral Daily    calcium citrate-vitamin D  2 tablet Oral Daily    magnesium oxide  400 mg Oral Daily    carvedilol  12.5 mg Oral BID WC    levothyroxine  25 mcg Oral Daily    sotalol  80 mg Oral BID     Continuous Infusions:   furosemide (LASIX) 1mg/ml infusion 15 mg/hr (02/01/20 1900)       CBC:   Recent Labs     02/01/20  0534 02/02/20  0542   WBC 2.9* 3.4*   HGB 10.3* 10.4*   * 97*     CMP:    Recent Labs     01/31/20  0547 02/01/20  0534 02/02/20  0542    138 135   K 4.2 4.4 4.5    99 97   CO2 22 19* 22   BUN 50* 55* 58*   CREATININE 4.43* 4.50* 4.67*   GLUCOSE 93 89 88   CALCIUM 8.8 8.9 8.7   LABGLOM 9.9* 9.7* 9.3*     Troponin: No results for input(s): TROPONINI in the last 72 hours. BNP: No results for input(s): BNP in the last 72 hours. INR:   Recent Labs     02/02/20  0542   INR 2.0     Lipids: No results for input(s): CHOL, LDLDIRECT, TRIG, HDL, AMYLASE, LIPASE in the last 72 hours.   Liver:   No results for input(s): AST, ALT, ALKPHOS, PROT, LABALBU,

## 2020-02-02 NOTE — PROGRESS NOTES
rhythm, prosthetic heart sounds  ABDOMEN: soft, non-distended, non-tender,no masses palpated,no hepatosplenomegaly  MUSCULOSKELETAL:  No tenderness over spine or ribs  EXTREMITIES:  2+ bipedal edema; no calf tenderness  NEUROLOGIC:  Awake, alert, oriented to name, place and time.   Non-focal  SKIN:  no bruising or bleeding and no rashes    DATA:      PT/INR:  No results found for: PTINR  PTT:  No results found for: APTT  CMP:    Lab Results   Component Value Date     02/02/2020    K 4.5 02/02/2020    CL 97 02/02/2020    CO2 22 02/02/2020    BUN 58 02/02/2020    PROT 6.7 01/28/2020     Magnesium:    Lab Results   Component Value Date    MG 2.7 02/01/2020     Phosphorus:  No components found for: PO4  Calcium:  No components found for: CA  CBC:    Lab Results   Component Value Date    WBC 3.4 02/02/2020    RBC 3.88 02/02/2020    HGB 10.4 02/02/2020    HCT 32.3 02/02/2020    MCV 83.3 02/02/2020    RDW 17.7 02/02/2020    PLT 97 02/02/2020     DIFF:    Lab Results   Component Value Date    MCV 83.3 02/02/2020    RDW 17.7 02/02/2020      LDH:  No results found for: LDH  Uric Acid:  No components found for: URIC    CBC with Differential:    Lab Results   Component Value Date    WBC 3.4 02/02/2020    RBC 3.88 02/02/2020    HGB 10.4 02/02/2020    HCT 32.3 02/02/2020    PLT 97 02/02/2020    MCV 83.3 02/02/2020    MCH 26.7 02/02/2020    MCHC 32.1 02/02/2020    RDW 17.7 02/02/2020    LYMPHOPCT 27.3 02/01/2020    MONOPCT 9.1 02/01/2020    BASOPCT 0.7 02/01/2020    MONOSABS 0.3 02/01/2020    LYMPHSABS 0.8 02/01/2020    EOSABS 0.1 02/01/2020    BASOSABS 0.0 02/01/2020     CMP:    Lab Results   Component Value Date     02/02/2020    K 4.5 02/02/2020    CL 97 02/02/2020    CO2 22 02/02/2020    BUN 58 02/02/2020    CREATININE 4.67 02/02/2020    GFRAA 11.3 02/02/2020    LABGLOM 9.3 02/02/2020    GLUCOSE 88 02/02/2020    GLUCOSE 108 01/07/2020    PROT 6.7 01/28/2020    LABALBU 3.6 01/28/2020    LABALBU 3.9 01/07/2020 CALCIUM 8.7 02/02/2020    BILITOT 0.7 01/28/2020    ALKPHOS 137 01/28/2020    AST 12 01/28/2020    ALT 8 01/28/2020     LDH:  No results found for: LDH  Warfarin PT/INR:  No components found for: PTPATWAR, PTINRWAR  PTT:  No results found for: APTT, PTT[APTT}  VITAMIN B12: No components found for: B12  FOLATE:    Lab Results   Component Value Date    FOLATE 9.0 02/01/2020     IRON:    Lab Results   Component Value Date    IRON 33 02/01/2020     Iron Saturation:  No components found for: PERCENTFE  TIBC:    Lab Results   Component Value Date    TIBC 262 02/01/2020     FERRITIN:    Lab Results   Component Value Date    FERRITIN 47.4 02/01/2020     Fibrinogen Level:  No components found for: FIB  24 Hour Urine for Protein:  No components found for: Gerald Alvarez, [id number], UTV3  PSA: No results found for: PSA          RADIOLOGY RESULTS:  Xr Chest Standard (2 Vw)    Result Date: 1/29/2020  EXAMINATION: XR CHEST (2 VW) DATE AND TIME:1/29/2020 8:15 AM CLINICAL HISTORY: Shortness of breath   SOB  COMPARISONS: None  FINDINGS: Cardiomegaly. Bilateral effusions. Increased reticular haziness. Findings suggest CHF.     CHF.      Us Dup Abd Pel Retro Scrot Complete    Result Date: 1/30/2020  EXAMINATION:  RENAL DUPLEX ULTRASONOGRAPHY CLINICAL HISTORY:  ACUTE KIDNEY INJURY ON CHRONIC KIDNEY INJURY COMPARISONS:  NONE AVAILABLE TECHNIQUE:  B-mode, color flow and spectral Doppler FINDINGS:  DIRECT MEASUREMENTS RIGHT                    PSV      EDV Origin          NV Proximal      NV Mid             NV Distal          NV LEFT                   PSV        EDV Origin         NV Proximal     NV Mid             NV Distal          NV RENAL ART/AORTIC RATIO Right   Left     INDIRECT MEASUREMENTS RIGHT           Resistive Index   Acceleration Time Sup        0.65                     60 Mid        0.76                     72 Inf          0.63                     68 LEFT           Resistive Index   Acceleration Time Sup        0.67 108 Mid        Inf              TECHNICALLY A DIFFICULT STUDY, RENAL VASCULATURE IS NOT WELL VISUALIZED. VELOCITY MEASUREMENTS NOT AVAILABLE. RIGHT KIDNEY ARTERY IS SIGNIFICANTLY CONTRACTED. LEFT KIDNEY ARTERY IS ON THE SMALL SIDE. ASSESSMENT AND PLAN:      1. Pancytopenia -No B12 or folate deficiency; pt has mild Fe deficiency r/o possible MDS. Pt will be started on oral Fe tx; FeSO4-325 mg 1 tab BID x 1 month. Possible BM asp & Bx in the future to evaluate for possible MDS  if there is progressively worsening pancytopenia. Pt to f/u in my office in 1 month.         Electronically signed by Hermelinda Carolina MD on 2/2/20 at 4:08 PM

## 2020-02-03 NOTE — PROGRESS NOTES
Renal Progress Note    Assessment and Plan:    1. ckd stage 5   2. Fluid overload   3. htn   4. Anemia     Agree with management by cardiology. High dose diuretics lasix drip and metolazone now changed to Po torsemide  K is borderline elevated  Agree stopping K supplementation  Increase torsemide to 40 mg due to very low renal function  Ok for discharge tomorrow to f/u w/ Dr. Twyla Norris       Patient Active Problem List:     Chronic atrial fibrillation     Essential hypertension     Pure hypercholesterolemia     Hyperlipidemia     Hypertension     CAD (coronary artery disease)     H/O prosthetic aortic valve replacement     On anticoagulant therapy     Varicose veins of both lower extremities     CHF (congestive heart failure), NYHA class I, acute on chronic, combined ([facility]      Subjective:   Admit Date: 1/28/2020    Interval History: Scr slowly uptrending, feels better, voiding well, 3.9 L yesterday       Medications:   Scheduled Meds:   warfarin  7.5 mg Oral Daily    metOLazone  5 mg Oral Daily    torsemide  20 mg Oral Daily    digoxin  125 mcg Oral Every Other Day    ferrous sulfate  325 mg Oral BID WC    spironolactone  25 mg Oral Daily    sennosides-docusate sodium  2 tablet Oral Daily    calcium citrate-vitamin D  2 tablet Oral Daily    magnesium oxide  400 mg Oral Daily    carvedilol  12.5 mg Oral BID WC    levothyroxine  25 mcg Oral Daily     Continuous Infusions:      CBC:   Recent Labs     02/02/20  0542 02/03/20  0548   WBC 3.4* 3.3*   HGB 10.4* 10.0*   PLT 97* 100*     CMP:    Recent Labs     02/01/20  0534 02/02/20  0542 02/03/20  0548    135 134*   K 4.4 4.5 5.0*   CL 99 97 93*   CO2 19* 22 24   BUN 55* 58* 63*   CREATININE 4.50* 4.67* 4.78*   GLUCOSE 89 88 93   CALCIUM 8.9 8.7 8.8   LABGLOM 9.7* 9.3* 9.1*     Troponin: No results for input(s): TROPONINI in the last 72 hours. BNP: No results for input(s): BNP in the last 72 hours.   INR:   Recent Labs     02/03/20  0548   INR 1.6

## 2020-02-03 NOTE — PROGRESS NOTES
Progress Note  Patient: Dick Kin  Unit/Bed: H611/I834-70  YOB: 1950  MRN: [id number]  Acct: [de-identified]   Admitting Diagnosis: CHF (congestive heart failure), NYHA class I, acute [facility] chronic[facility] [I50.43]  Admit Date:  1/28/2020  Hospital Day: 6    Chief Complaint: CKD/MARCELL HF SOB  MechAVR TR    Histories:  Past Medical History:   Diagnosis Date    A-fib [facility])     CAD (coronary artery disease)     Hyperlipidemia     Hypertension      Past Surgical History:   Procedure Laterality Date    CARDIAC CATHETERIZATION  2002    CARDIAC VALVE REPLACEMENT      st judes valve replacment of aortic valve.       Family History   Problem Relation Age of Onset    Heart Attack Mother     Heart Attack Father     Heart Disease Brother      Social History     Socioeconomic History    Marital status:      Spouse name: None    Number of children: None    Years of education: None    Highest education level: None   Occupational History    None   Social Needs    Financial resource strain: None    Food insecurity:     Worry: None     Inability: None    Transportation needs:     Medical: None     Non-medical: None   Tobacco Use    Smoking status: Never Smoker    Smokeless tobacco: Never Used   Substance and Sexual Activity    Alcohol use: Never     Frequency: Never    Drug use: No    Sexual activity: None   Lifestyle    Physical activity:     Days per week: None     Minutes per session: None    Stress: None   Relationships    Social connections:     Talks on phone: None     Gets together: None     Attends [employer] service: None     Active member of club or organization: None     Attends meetings of clubs or organizations: None     Relationship status: None    Intimate partner violence:     Fear of current or ex partner: None     Emotionally abused: None     Physically abused: None     Forced sexual activity: None   Other Topics Concern    None   Social History Narrative    None Subjective/HPI  Doing much better. No cp no sob walking the halls. Eating well. Has diuresed > 17L since admission. Near 35 Lbs of water. EKG: AF 80        Review of Systems:   Review of Systems   Constitutional: Negative. Negative for diaphoresis and fatigue. HENT: Negative. Eyes: Negative. Respiratory: Positive for shortness of breath. Negative for cough, chest tightness, wheezing and stridor. Cardiovascular: Positive for leg swelling. Negative for chest pain and palpitations. Gastrointestinal: Negative. Negative for blood in stool and nausea. Genitourinary: Negative. Musculoskeletal: Negative. Skin: Negative. Neurological: Positive for weakness. Negative for dizziness, syncope and light-headedness. Hematological: Negative. Psychiatric/Behavioral: Negative. Physical Examination:    /87   Pulse 80   Temp 97.9 °F (36.6 °C) (Oral)   Resp 18   Ht 5' 7\" (1.702 m)   Wt 186 lb 12.8 oz (84.7 kg)   SpO2 98%   BMI 29.26 kg/m²    Physical Exam   Constitutional: No distress. She appears chronically ill and acutely ill. HENT:   Normal cephalic and Atraumatic   Eyes: Pupils are equal, round, and reactive to light. Neck: Normal range of motion and thyroid normal. Neck supple. No JVD present. No neck adenopathy. No thyromegaly present. Cardiovascular: Normal rate and normal heart sounds. An irregularly irregular rhythm present. Pulmonary/Chest: Effort normal and breath sounds normal. She has no wheezes. She has no rales. She exhibits no tenderness. Abdominal: Soft. Bowel sounds are normal. There is no abdominal tenderness. Musculoskeletal: Normal range of motion. General: Edema (2-3+) present. No tenderness. Neurological: She is alert and oriented to person, place, and time. Skin: Skin is warm. No cyanosis. Nails show no clubbing.        LABS:  CBC:   Lab Results   Component Value Date    WBC 3.3 02/03/2020    RBC 3.76 02/03/2020    HGB 10.0 02/03/2020    HCT 31.0 02/03/2020    MCV 82.4 02/03/2020    MCH 26.7 02/03/2020    MCHC 32.4 02/03/2020    RDW 17.6 02/03/2020     02/03/2020     CBC with Differential:    Lab Results   Component Value Date    WBC 3.3 02/03/2020    RBC 3.76 02/03/2020    HGB 10.0 02/03/2020    HCT 31.0 02/03/2020     02/03/2020    MCV 82.4 02/03/2020    MCH 26.7 02/03/2020    MCHC 32.4 02/03/2020    RDW 17.6 02/03/2020    LYMPHOPCT 27.3 02/01/2020    MONOPCT 9.1 02/01/2020    BASOPCT 0.7 02/01/2020    MONOSABS 0.3 02/01/2020    LYMPHSABS 0.8 02/01/2020    EOSABS 0.1 02/01/2020    BASOSABS 0.0 02/01/2020     CMP:    Lab Results   Component Value Date     02/03/2020    K 5.0 02/03/2020    CL 93 02/03/2020    CO2 24 02/03/2020    BUN 63 02/03/2020    CREATININE 4.78 02/03/2020    GFRAA 11.0 02/03/2020    LABGLOM 9.1 02/03/2020    GLUCOSE 93 02/03/2020    GLUCOSE 108 01/07/2020    PROT 6.7 01/28/2020    LABALBU 3.6 01/28/2020    LABALBU 3.9 01/07/2020    CALCIUM 8.8 02/03/2020    BILITOT 0.7 01/28/2020    ALKPHOS 137 01/28/2020    AST 12 01/28/2020    ALT 8 01/28/2020     BMP:    Lab Results   Component Value Date     02/03/2020    K 5.0 02/03/2020    CL 93 02/03/2020    CO2 24 02/03/2020    BUN 63 02/03/2020    LABALBU 3.6 01/28/2020    LABALBU 3.9 01/07/2020    CREATININE 4.78 02/03/2020    CALCIUM 8.8 02/03/2020    GFRAA 11.0 02/03/2020    LABGLOM 9.1 02/03/2020    GLUCOSE 93 02/03/2020    GLUCOSE 108 01/07/2020     Magnesium:    Lab Results   Component Value Date    MG 2.7 02/01/2020     Troponin:  No results found for: [place] Problems    Diagnosis Date Noted    CHF (congestive heart failure), NYHA class I, acute on chronic, [facility] [I50.43] 01/28/2020        Assessment/Plan:  1. A/C DHF - diuresing well. GFR relatively stable. Will dc gtt and start Torsemide 20 + Metolazone 5mg. BERNARDA Lopez. Observe today. Start Compression stockings. Plan dc tomorrow if stable.    2. CKD/MARCELL

## 2020-02-04 NOTE — DISCHARGE INSTR - ACTIVITY
As tolerated     Weigh yourself daily and record   Notify Dr if any increase in weight gain 3-5lbs in 2-3 days or increase swelling

## 2020-02-04 NOTE — FLOWSHEET NOTE
Discharge home, from unit via [facility] - transporter assistance. Pt given discharge instructions and understanding indicated. Reviewed coumadin dosing with follow up labs / appointments as specified by Dr. Juancarlos Childs. Also reviewed CHF teaching with daily weights, 1500ml fluid restriction, diet and new medications.  Electronically signed by Erika Mei RN on 2/4/2020 at 10:15 AM

## 2020-02-04 NOTE — DISCHARGE INSTR - DIET

## 2020-02-04 NOTE — DISCHARGE SUMMARY
Cardiology Discharge Summary      Patient Identification:  Rosaura Rodríguez  : 1950  MRN: [id number]   Account: [de-identified]     Admit date: 2020  Discharge date: 2020   Attending provider: Jose Wilson MD        Primary care provider: Lucero Caldera MD     Admission Diagnoses:  <principal problem not specified>         Discharge Diagnoses: Active Hospital Problems    Diagnosis Date Noted    CHF (congestive heart failure), NYHA class I, acute on chronic, [facility] [I50.43] 2020          Hospital Course:   Rosaura Rodríguez is a 77 y.o. female admitted to [facility] on 2020 for . Procedures:   1.       Consults:   IP CONSULT TO NEPHROLOGY  IP CONSULT TO DIETITIAN  IP CONSULT TO HEM/ONC    Examination:  /64   Pulse 76   Temp 97.2 °F (36.2 °C) (Oral)   Resp 18   Ht 5' 7\" (1.702 m)   Wt 186 lb 12.8 oz (84.7 kg)   SpO2 96%   BMI 29.26 kg/m²    Physical Exam    Medications:  Current Discharge Medication List      START taking these medications    Details   digoxin (LANOXIN) 125 MCG tablet Take 1 tablet by mouth every other day  Qty: 30 tablet, Refills: 3      metOLazone (ZAROXOLYN) 2.5 MG tablet Take 1 tablet by mouth daily  Qty: 30 tablet, Refills: 3      torsemide (DEMADEX) 20 MG tablet Take 2 tablets by mouth daily  Qty: 30 tablet, Refills: 3      ferrous sulfate 325 (65 Fe) MG tablet Take 1 tablet by mouth 2 times daily (with meals)  Qty: 30 tablet, Refills: 3      sennosides-docusate sodium (SENOKOT-S) 8.6-50 MG tablet Take 2 tablets by mouth daily  Qty: 30 tablet, Refills: 3         CONTINUE these medications which have NOT CHANGED    Details   levothyroxine (SYNTHROID) 25 MCG tablet TAKE 1 TABLET DAILY  Qty: 90 tablet, Refills: 0      Compression Stockings MISC by Does not apply route 1 pair of knee high compression stockings( Jobst) 15-20mmHG  Qty: 2 each, Refills: 2    Associated Diagnoses: Paroxysmal atrial fibrillation ([facility] Bilateral leg edema      Elastic Bandages & Supports (JOBST KNEE HIGH COMPRESSION SM) MISC 2 each by Does not apply route daily  Qty: 1 each, Refills: 2      carvedilol (COREG) 12.5 MG tablet Take 1 tablet by mouth 2 times daily (with meals)  Qty: 60 tablet, Refills: 3      Calcium Carbonate-Vitamin D 600-400 MG-UNIT CHEW one tab qd      Magnesium Chloride 200 MG/ML SOLN 2 tabs po qod      warfarin (COUMADIN) 5 MG tablet TAKE 1 TABLET MONDAY, WEDNESDAY, AND FRIDAY, 2 TABLETS ALL OTHER DAYS  Qty: 180 tablet, Refills: 0    Associated Diagnoses: Paroxysmal atrial fibrillation (HCC)         STOP taking these medications       sotalol (BETAPACE) 80 MG tablet Comments:   Reason for Stopping:         furosemide (LASIX) 40 MG tablet Comments:   Reason for Stopping:               Significant Diagnostics:   Radiology: Xr Chest Standard (2 Vw)    Result Date: 1/29/2020  EXAMINATION: XR CHEST (2 VW) DATE AND TIME:1/29/2020 8:15 AM CLINICAL HISTORY: Shortness of breath   SOB  COMPARISONS: None  FINDINGS: Cardiomegaly. Bilateral effusions. Increased reticular haziness. Findings suggest CHF.     CHF.      Us Dup Abd Pel Retro Scrot Complete    Result Date: 1/30/2020  EXAMINATION:  RENAL DUPLEX ULTRASONOGRAPHY CLINICAL HISTORY:  ACUTE KIDNEY INJURY ON CHRONIC KIDNEY INJURY COMPARISONS:  NONE AVAILABLE TECHNIQUE:  B-mode, color flow and spectral Doppler FINDINGS:  DIRECT MEASUREMENTS RIGHT                    PSV      EDV Origin          NV Proximal      NV Mid             NV Distal          NV LEFT                   PSV        EDV Origin         NV Proximal     NV Mid             NV Distal          NV RENAL ART/AORTIC RATIO Right   Left     INDIRECT MEASUREMENTS RIGHT           Resistive Index   Acceleration Time Sup        0.65                     60 Mid        0.76                     72 Inf          0.63                     68 LEFT           Resistive Index   Acceleration Time Sup 0.67                     108 Mid        Inf              TECHNICALLY A DIFFICULT STUDY, RENAL VASCULATURE IS NOT WELL VISUALIZED. VELOCITY MEASUREMENTS NOT AVAILABLE. RIGHT KIDNEY ARTERY IS SIGNIFICANTLY CONTRACTED. LEFT KIDNEY ARTERY IS ON THE SMALL SIDE.        Labs:   Recent Results (from the past 72 hour(s))   CBC    Collection Time: 02/02/20  5:42 AM   Result Value Ref Range    WBC 3.4 (L) 4.8 - 10.8 K/uL    RBC 3.88 (L) 4.20 - 5.40 M/uL    Hemoglobin 10.4 (L) 12.0 - 16.0 g/dL    Hematocrit 32.3 (L) 37.0 - 47.0 %    MCV 83.3 82.0 - 100.0 fL    MCH 26.7 (L) 27.0 - 31.3 pg    MCHC 32.1 (L) 33.0 - 37.0 %    RDW 17.7 (H) 11.5 - 14.5 %    Platelets 97 (L) 758 - 400 K/uL   Basic Metabolic Panel    Collection Time: 02/02/20  5:42 AM   Result Value Ref Range    Sodium 135 135 - 144 mEq/L    Potassium 4.5 3.4 - 4.9 mEq/L    Chloride 97 95 - 107 mEq/L    CO2 22 20 - 31 mEq/L    Anion Gap 16 (H) 9 - 15 mEq/L    Glucose 88 70 - 99 mg/dL    BUN 58 (H) 8 - 23 mg/dL    CREATININE 4.67 (H) 0.50 - 0.90 mg/dL    GFR Non-African American 9.3 (L) >60    GFR  11.3 (L) >60    Calcium 8.7 8.5 - 9.9 mg/dL   Protime-INR    Collection Time: 02/02/20  5:42 AM   Result Value Ref Range    Protime 23.5 (H) 12.3 - 14.9 sec    INR 2.0    CBC    Collection Time: 02/03/20  5:48 AM   Result Value Ref Range    WBC 3.3 (L) 4.8 - 10.8 K/uL    RBC 3.76 (L) 4.20 - 5.40 M/uL    Hemoglobin 10.0 (L) 12.0 - 16.0 g/dL    Hematocrit 31.0 (L) 37.0 - 47.0 %    MCV 82.4 82.0 - 100.0 fL    MCH 26.7 (L) 27.0 - 31.3 pg    MCHC 32.4 (L) 33.0 - 37.0 %    RDW 17.6 (H) 11.5 - 14.5 %    Platelets 567 (L) 613 - 400 K/uL   Basic Metabolic Panel    Collection Time: 02/03/20  5:48 AM   Result Value Ref Range    Sodium 134 (L) 135 - 144 mEq/L    Potassium 5.0 (H) 3.4 - 4.9 mEq/L    Chloride 93 (L) 95 - 107 mEq/L    CO2 24 20 - 31 mEq/L    Anion Gap 17 (H) 9 - 15 mEq/L    Glucose 93 70 - 99 mg/dL    BUN 63 (H) 8 - 23 mg/dL    CREATININE 4.78 (H) 0.50 - 0.90 mg/dL    GFR Non-African American 9.1 (L) >60    GFR  11.0 (L) >60    Calcium 8.8 8.5 - 9.9 mg/dL   Protime-INR    Collection Time: 02/03/20  5:48 AM   Result Value Ref Range    Protime 20.1 (H) 12.3 - 14.9 sec    INR 1.6    CBC    Collection Time: 02/04/20  5:54 AM   Result Value Ref Range    WBC 3.5 (L) 4.8 - 10.8 K/uL    RBC 3.72 (L) 4.20 - 5.40 M/uL    Hemoglobin 9.9 (L) 12.0 - 16.0 g/dL    Hematocrit 30.8 (L) 37.0 - 47.0 %    MCV 82.8 82.0 - 100.0 fL    MCH 26.7 (L) 27.0 - 31.3 pg    MCHC 32.3 (L) 33.0 - 37.0 %    RDW 17.5 (H) 11.5 - 14.5 %    Platelets 044 (L) 611 - 400 K/uL   Basic Metabolic Panel    Collection Time: 02/04/20  5:54 AM   Result Value Ref Range    Sodium 135 135 - 144 mEq/L    Potassium 5.4 (H) 3.4 - 4.9 mEq/L    Chloride 93 (L) 95 - 107 mEq/L    CO2 25 20 - 31 mEq/L    Anion Gap 17 (H) 9 - 15 mEq/L    Glucose 85 70 - 99 mg/dL    BUN 66 (H) 8 - 23 mg/dL    CREATININE 5.00 (H) 0.50 - 0.90 mg/dL    GFR Non-African American 8.6 (L) >60    GFR  10.4 (L) >60    Calcium 8.8 8.5 - 9.9 mg/dL   Protime-INR    Collection Time: 02/04/20  5:54 AM   Result Value Ref Range    Protime 18.4 (H) 12.3 - 14.9 sec    INR 1.5            Follow-up visits:   Krystal Peacock MD  [address]  [place] [address] [facility]  [address] [place] [phone]    In 1 month      Carolina Fierro MD  [address]  [address] [facility]  [phone]    In 1 week         [place] Problems    Diagnosis Date Noted    CHF (congestive heart failure), NYHA class I, acute on chronic, [facility] [I50.43] 01/28/2020     2. A/C DHF - diuresing well. GFR relatively stable. Continues to diurese well on Torsemide 40 + Metolazone 5.  K is at 5.0 and GFR worse. Will decrease Metolazone to 2.5 QD and dc Aldactone 25 for now. SHe will get CBC BMP nad INR Next MOnday and see me next Tuesday.   3. CKD/MARCELL - Renal US shows B/L Contracted Kidneys R>L. Stage 5.    4. CXR shows B/L small Pleural effusion. Lung exam is improved. 5. HTN stable  6. HyperKalemia- held K and stopped Aldactone. 7. AF- reamins in AF. Rate controlled. stoppd Sotalol. LA severe dilated. 8. Echo - LVEF 55%. .  [facility] AVR fine. Severe TR and severely dilated LA  9. [facility] AVR- INR 1.5 this am. Warfarin 10 mg  now. Usual home dose has been ( Monday 10 mg and rest of the week 7.5 mg). Recheck INR. 10. Pancytopenia - consult Hematology - Thank You. Iron now. Probable out pt BM Bx as needed. 1. DC home - will need Nephrology and Hematology f/u.   2. Dc> 30   3. Lab slips made available and discussed. 4. Will provide compression stockings.                 Electronically signed by Paddy Pastor MD on 2/4/2020 at 7:19 AM

## 2020-02-10 NOTE — PROGRESS NOTES
Ms. Heath Dickson is a 77 y.o. y/o female with history of Afib who presents today for anticoagulation monitoring and adjustment. INR 2.7 is therapeutic for this patient (goal range 2-3) and is reflective of 55 mg TWD  Patient verifies current dosing regimen, patient able to verbally recall dose  Patient reports 0  missed doses since last INR   Patient denies s/sx clotting and/or stroke  Patient denies hematuria, epistaxis, rectal bleeding  Patient denies changes in diet, alcohol, or tobacco use  Reviewed medication list and drug allergies with patient, updated any medication additions or modifications accordingly. Pt was discharged from hospital last Wed from [facility].   Patient also denies any pending medical or dental procedures scheduled at this time  Patient was instructed to continue 55 mg TWD and RTC 5 weeks

## 2020-02-11 NOTE — PROGRESS NOTES
Subsequent Progress Note  Patient: Olu Lopez  YOB: 1950  MRN: [id number]    Chief Complaint:AVR  Chief Complaint   Patient presents with   [address] from [facility] 1/28-2/4/20    Congestive Heart Failure    Chronic Kidney Disease     CKD/MARCELL    Atrial Fibrillation    Edema     BLE-much improved from last office visit       CV Data:  1978 AV volvotomy  2002 [address] Valve  1/2020 echo EF 55 Mech AV  Subjective/HPI: no cp no sob no bleed no falls take smeds    2/11/2020 recent hosp for HF diuresed near 20L. She feels better. No cp breathing much better. Takes meds. Cr Stable     INR 2.7     EKG: AF 82    Past Medical History:   Diagnosis Date    A-fib ([facility] [address]     CAD (coronary artery disease)     CKD (chronic kidney disease)     Hyperlipidemia     Hypertension        Past Surgical History:   Procedure Laterality Date    CARDIAC CATHETERIZATION  2002    CARDIAC VALVE REPLACEMENT      st judes valve replacment of aortic valve.         Family History   Problem Relation Age of Onset    Heart Attack Mother     Heart Attack Father     Heart Disease Brother        Social History     Socioeconomic History    Marital status:      Spouse name: None    Number of children: None    Years of education: None    Highest education level: None   Occupational History    None   Social Needs    Financial resource strain: None    Food insecurity:     Worry: None     Inability: None    Transportation needs:     Medical: None     Non-medical: None   Tobacco Use    Smoking status: Never Smoker    Smokeless tobacco: Never Used   Substance and Sexual Activity    Alcohol use: Never     Frequency: Never    Drug use: No    Sexual activity: None   Lifestyle    Physical activity:     Days per week: None     Minutes per session: None    Stress: None   Relationships    Social connections:     Talks on phone: None     Gets together: None     Attends Rastafarian service: None tightness, shortness of breath, wheezing and stridor. Cardiovascular: Positive for leg swelling. Negative for chest pain and palpitations. Gastrointestinal: Negative. Negative for blood in stool and nausea. Genitourinary: Negative. Musculoskeletal: Negative. Skin: Negative. Neurological: Negative. Negative for dizziness, syncope, weakness and light-headedness. Hematological: Negative. Psychiatric/Behavioral: Negative. Physical Examination:    /86 (Site: Left Upper Arm, Position: Sitting, Cuff Size: Medium Adult)   Pulse 76   Resp 18   Wt 183 lb 6.4 oz (83.2 kg)   SpO2 98%   BMI 28.72 kg/m²    Physical Exam   Constitutional: She appears healthy. No distress. HENT:   Normal cephalic and Atraumatic   Eyes: Pupils are equal, round, and reactive to light. Neck: Normal range of motion and thyroid normal. Neck supple. No JVD present. No neck adenopathy. No thyromegaly present. Cardiovascular: Normal rate, regular rhythm, intact distal pulses and normal pulses. Murmur heard. Pulmonary/Chest: Effort normal and breath sounds normal. She has no wheezes. She has no rales. She exhibits no tenderness. Abdominal: Soft. Bowel sounds are normal. There is no abdominal tenderness. Musculoskeletal: Normal range of motion. General: No tenderness or edema. Neurological: She is alert and oriented to person, place, and time. Skin: Skin is warm. No cyanosis. Nails show no clubbing.        LABS:  CBC:   Lab Results   Component Value Date    WBC 3.7 02/07/2020    WBC 3.5 02/04/2020    RBC 3.79 02/07/2020    HGB 10.0 02/07/2020    HCT 32.5 02/07/2020    MCV 86 02/07/2020    MCH 26.7 02/04/2020    MCHC 30.8 02/07/2020    RDW 17.5 02/04/2020     02/07/2020     Lipids:No results found for: CHOL  No results found for: TRIG  No results found for: HDL  No results found for: LDLCHOLESTEROL, LDLCALC  No results found for: LABVLDL, VLDL  No results found for: CHOLHDLRATIO  CMP:

## 2020-02-24 NOTE — TELEPHONE ENCOUNTER
Patient calling complaining of diahreaa. She states it started yesterday and she has been going all night and all morning. She states it is very foul smelling and it is red, but she was told in the hospital it was not blood it was red from the color of the pill. She is very concerned as it is very runny and very foul smelling.

## 2020-05-04 ENCOUNTER — ANTI-COAG VISIT (OUTPATIENT)
Dept: PHARMACY | Age: 70
End: 2020-05-04